# Patient Record
Sex: MALE | Race: BLACK OR AFRICAN AMERICAN | Employment: UNEMPLOYED | URBAN - METROPOLITAN AREA
[De-identification: names, ages, dates, MRNs, and addresses within clinical notes are randomized per-mention and may not be internally consistent; named-entity substitution may affect disease eponyms.]

---

## 2022-06-14 ENCOUNTER — APPOINTMENT (OUTPATIENT)
Dept: CT IMAGING | Age: 32
DRG: 753 | End: 2022-06-14
Payer: MEDICAID

## 2022-06-14 ENCOUNTER — HOSPITAL ENCOUNTER (INPATIENT)
Age: 32
LOS: 4 days | Discharge: HOME | DRG: 753 | End: 2022-06-20
Attending: EMERGENCY MEDICINE | Admitting: PSYCHIATRY & NEUROLOGY
Payer: MEDICAID

## 2022-06-14 DIAGNOSIS — R45.851 SUICIDAL IDEATION: Primary | ICD-10-CM

## 2022-06-14 LAB
ACETAMINOPHEN LEVEL: <5 MCG/ML (ref 10–30)
ALBUMIN SERPL-MCNC: 5.3 G/DL (ref 3.5–5.2)
ALP BLD-CCNC: 78 U/L (ref 40–129)
ALT SERPL-CCNC: 9 U/L (ref 0–40)
AMPHETAMINE SCREEN, URINE: NOT DETECTED
ANION GAP SERPL CALCULATED.3IONS-SCNC: 22 MMOL/L (ref 7–16)
AST SERPL-CCNC: 19 U/L (ref 0–39)
BACTERIA: ABNORMAL /HPF
BARBITURATE SCREEN URINE: NOT DETECTED
BASOPHILS ABSOLUTE: 0.02 E9/L (ref 0–0.2)
BASOPHILS RELATIVE PERCENT: 0.6 % (ref 0–2)
BENZODIAZEPINE SCREEN, URINE: NOT DETECTED
BILIRUB SERPL-MCNC: 2.2 MG/DL (ref 0–1.2)
BILIRUBIN URINE: ABNORMAL
BLOOD, URINE: ABNORMAL
BUN BLDV-MCNC: 15 MG/DL (ref 6–20)
CALCIUM SERPL-MCNC: 9.9 MG/DL (ref 8.6–10.2)
CANNABINOID SCREEN URINE: NOT DETECTED
CHLORIDE BLD-SCNC: 102 MMOL/L (ref 98–107)
CLARITY: ABNORMAL
CO2: 13 MMOL/L (ref 22–29)
COCAINE METABOLITE SCREEN URINE: NOT DETECTED
COLOR: YELLOW
CREAT SERPL-MCNC: 1.2 MG/DL (ref 0.7–1.2)
EOSINOPHILS ABSOLUTE: 0.04 E9/L (ref 0.05–0.5)
EOSINOPHILS RELATIVE PERCENT: 1.2 % (ref 0–6)
EPITHELIAL CELLS, UA: ABNORMAL /HPF
ETHANOL: <10 MG/DL (ref 0–0.08)
FENTANYL SCREEN, URINE: NOT DETECTED
GFR AFRICAN AMERICAN: >60
GFR NON-AFRICAN AMERICAN: >60 ML/MIN/1.73
GLUCOSE BLD-MCNC: 71 MG/DL (ref 74–99)
GLUCOSE URINE: NEGATIVE MG/DL
HCT VFR BLD CALC: 46.7 % (ref 37–54)
HEMOGLOBIN: 16 G/DL (ref 12.5–16.5)
IMMATURE GRANULOCYTES #: 0 E9/L
IMMATURE GRANULOCYTES %: 0 % (ref 0–5)
INFLUENZA A: NOT DETECTED
INFLUENZA B: NOT DETECTED
KETONES, URINE: >=80 MG/DL
LACTIC ACID: 1.2 MMOL/L (ref 0.5–2.2)
LEUKOCYTE ESTERASE, URINE: NEGATIVE
LYMPHOCYTES ABSOLUTE: 0.88 E9/L (ref 1.5–4)
LYMPHOCYTES RELATIVE PERCENT: 27.3 % (ref 20–42)
Lab: NORMAL
MCH RBC QN AUTO: 31.8 PG (ref 26–35)
MCHC RBC AUTO-ENTMCNC: 34.3 % (ref 32–34.5)
MCV RBC AUTO: 92.8 FL (ref 80–99.9)
METHADONE SCREEN, URINE: NOT DETECTED
MONOCYTES ABSOLUTE: 0.3 E9/L (ref 0.1–0.95)
MONOCYTES RELATIVE PERCENT: 9.3 % (ref 2–12)
MUCUS: PRESENT /LPF
NEUTROPHILS ABSOLUTE: 1.98 E9/L (ref 1.8–7.3)
NEUTROPHILS RELATIVE PERCENT: 61.6 % (ref 43–80)
NITRITE, URINE: NEGATIVE
OPIATE SCREEN URINE: NOT DETECTED
OXYCODONE URINE: NOT DETECTED
PDW BLD-RTO: 11.9 FL (ref 11.5–15)
PH UA: 6 (ref 5–9)
PHENCYCLIDINE SCREEN URINE: NOT DETECTED
PLATELET # BLD: 233 E9/L (ref 130–450)
PMV BLD AUTO: 9.7 FL (ref 7–12)
POTASSIUM REFLEX MAGNESIUM: 4.9 MMOL/L (ref 3.5–5)
PROTEIN UA: 100 MG/DL
RBC # BLD: 5.03 E12/L (ref 3.8–5.8)
RBC UA: ABNORMAL /HPF (ref 0–2)
SALICYLATE, SERUM: <0.3 MG/DL (ref 0–30)
SARS-COV-2 RNA, RT PCR: NOT DETECTED
SODIUM BLD-SCNC: 137 MMOL/L (ref 132–146)
SPECIFIC GRAVITY UA: >=1.03 (ref 1–1.03)
TOTAL PROTEIN: 8.1 G/DL (ref 6.4–8.3)
TRICYCLIC ANTIDEPRESSANTS SCREEN SERUM: NEGATIVE NG/ML
UROBILINOGEN, URINE: 0.2 E.U./DL
WBC # BLD: 3.2 E9/L (ref 4.5–11.5)
WBC UA: ABNORMAL /HPF (ref 0–5)

## 2022-06-14 PROCEDURE — 96361 HYDRATE IV INFUSION ADD-ON: CPT

## 2022-06-14 PROCEDURE — 85025 COMPLETE CBC W/AUTO DIFF WBC: CPT

## 2022-06-14 PROCEDURE — 80143 DRUG ASSAY ACETAMINOPHEN: CPT

## 2022-06-14 PROCEDURE — 87636 SARSCOV2 & INF A&B AMP PRB: CPT

## 2022-06-14 PROCEDURE — 80053 COMPREHEN METABOLIC PANEL: CPT

## 2022-06-14 PROCEDURE — 96360 HYDRATION IV INFUSION INIT: CPT

## 2022-06-14 PROCEDURE — 93005 ELECTROCARDIOGRAM TRACING: CPT | Performed by: PHYSICIAN ASSISTANT

## 2022-06-14 PROCEDURE — 99285 EMERGENCY DEPT VISIT HI MDM: CPT

## 2022-06-14 PROCEDURE — 2580000003 HC RX 258: Performed by: EMERGENCY MEDICINE

## 2022-06-14 PROCEDURE — 83605 ASSAY OF LACTIC ACID: CPT

## 2022-06-14 PROCEDURE — 82077 ASSAY SPEC XCP UR&BREATH IA: CPT

## 2022-06-14 PROCEDURE — 81001 URINALYSIS AUTO W/SCOPE: CPT

## 2022-06-14 PROCEDURE — 70450 CT HEAD/BRAIN W/O DYE: CPT

## 2022-06-14 PROCEDURE — 80179 DRUG ASSAY SALICYLATE: CPT

## 2022-06-14 PROCEDURE — 80307 DRUG TEST PRSMV CHEM ANLYZR: CPT

## 2022-06-14 RX ORDER — 0.9 % SODIUM CHLORIDE 0.9 %
1000 INTRAVENOUS SOLUTION INTRAVENOUS ONCE
Status: COMPLETED | OUTPATIENT
Start: 2022-06-14 | End: 2022-06-15

## 2022-06-14 RX ORDER — 0.9 % SODIUM CHLORIDE 0.9 %
1000 INTRAVENOUS SOLUTION INTRAVENOUS ONCE
Status: COMPLETED | OUTPATIENT
Start: 2022-06-14 | End: 2022-06-14

## 2022-06-14 RX ADMIN — SODIUM CHLORIDE 1000 ML: 9 INJECTION, SOLUTION INTRAVENOUS at 22:03

## 2022-06-14 RX ADMIN — SODIUM CHLORIDE 1000 ML: 9 INJECTION, SOLUTION INTRAVENOUS at 22:52

## 2022-06-14 ASSESSMENT — PATIENT HEALTH QUESTIONNAIRE - PHQ9: SUM OF ALL RESPONSES TO PHQ QUESTIONS 1-9: 25

## 2022-06-14 NOTE — ED NOTES
Department of Emergency Medicine  FIRST PROVIDER TRIAGE NOTE             Independent MLP           6/14/22  7:07 PM EDT    Date of Encounter: 6/14/22   MRN: [id number]      HPI: Sangeeta Delatorre is a 33 y.o. male who presents to the ED for Psychiatric Evaluation (+SI -HI. states 'feels that death would be easier then this' denies hallucinations )    Pt reports years of untreated anxiety and depression and now \"Death just feels so much easier. \"    ROS: Negative for cp or sob. PE: Gen Appearance/Constitutional: alert  Musculoskeletal: moves all extremities x 4     Initial Plan of Care: All treatment areas with department are currently occupied.  Plan to order/Initiate the following while awaiting opening in ED: labs, EKG, imaging studies and Social Work Eval.  Initiate Treatment-Testing, Proceed toTreatment Area When [employer] Available for ED Attending/MLP to [employer] signed by Daniel Johnson PA-C   DD: 6/14/22         Daniel Johnson PA-C  06/14/22 1494

## 2022-06-15 LAB
ANION GAP SERPL CALCULATED.3IONS-SCNC: 8 MMOL/L (ref 7–16)
BUN BLDV-MCNC: 12 MG/DL (ref 6–20)
CALCIUM SERPL-MCNC: 8.2 MG/DL (ref 8.6–10.2)
CHLORIDE BLD-SCNC: 109 MMOL/L (ref 98–107)
CO2: 18 MMOL/L (ref 22–29)
CREAT SERPL-MCNC: 1 MG/DL (ref 0.7–1.2)
GFR AFRICAN AMERICAN: >60
GFR NON-AFRICAN AMERICAN: >60 ML/MIN/1.73
GLUCOSE BLD-MCNC: 164 MG/DL (ref 74–99)
POTASSIUM REFLEX MAGNESIUM: 5 MMOL/L (ref 3.5–5)
SODIUM BLD-SCNC: 135 MMOL/L (ref 132–146)

## 2022-06-15 PROCEDURE — 80048 BASIC METABOLIC PNL TOTAL CA: CPT

## 2022-06-15 ASSESSMENT — ENCOUNTER SYMPTOMS
COUGH: 0
COLOR CHANGE: 0
CONSTIPATION: 0
CHEST TIGHTNESS: 0
NAUSEA: 0
SHORTNESS OF BREATH: 0
BACK PAIN: 0
ABDOMINAL PAIN: 0
DIARRHEA: 0
VOMITING: 0

## 2022-06-15 NOTE — ED NOTES
Patient to CT with transport. [facility] ([facility])  with patient due to pink slip.       Polo Garcia RN  06/14/22 2039

## 2022-06-15 NOTE — ED NOTES
Pt's relative (Balwinder Jules) called [facility] and asked about picking up pt's car keys in order to bring his car home from ED parking lot.  Pt's family was informed to let the [facility] police know when they arrive and why they are there, and to have police call [facility] to further assist.      Jayla Cain, EMILY  06/15/22 7334

## 2022-06-15 NOTE — ED PROVIDER NOTES
ED Attending shared visit  CC: No        Department of Emergency Medicine   ED  Provider Note  Admit Date/RoomTime: 6/14/2022  7:04 PM  ED Room: [address] [place] [address]  HPI:  6/14/22, Time: 11:11 PM EDT      The patient is a 38-year-old male with no known past medical history presenting to the emergency department with thoughts of suicide. Patient states that \"I am just not well. \"  He states that his sister told him that he has had too long of undiagnosed anxiety and depression and now he is feeling like death is the only way out. Patient states Velna Pole is just seems so much easier. \"  Patient denies any specific plan to injure himself but states \"I will just let nature take its course. \"  Patient denies a history of SI in the past.  He also denies HI. He denies use of any drugs or alcohol. He is not prescribed any medications. He denies any chest pain, shortness of breath, dizziness, abdominal pain, nausea vomiting, diarrhea. The history is provided by the patient. No  was used. REVIEW OF SYSTEMS:  Review of Systems   Constitutional: Negative for activity change, chills, fatigue and fever. Respiratory: Negative for cough, chest tightness and shortness of breath. Cardiovascular: Negative for chest pain, palpitations and leg swelling. Gastrointestinal: Negative for abdominal pain, constipation, diarrhea, nausea and vomiting. Genitourinary: Negative for dysuria, flank pain, frequency and hematuria. Musculoskeletal: Negative for back pain, neck pain and neck stiffness. Skin: Negative for color change, pallor and rash. Neurological: Negative for dizziness, light-headedness and headaches. Psychiatric/Behavioral: Positive for dysphoric mood and suicidal ideas. Negative for agitation, behavioral problems, confusion, hallucinations, self-injury and sleep disturbance. The patient is not nervous/anxious and is not hyperactive.        Pertinent positives and negatives are stated within HPI, all other systems reviewed and are negative.      --------------------------------------------- PAST HISTORY ---------------------------------------------  Past Medical History:  has no past medical history on file. Past Surgical History:  has no past surgical history on file. Social History:  reports that he has never smoked. He has never used smokeless tobacco. He reports previous alcohol use. He reports previous drug use. Family History: family history is not on file. The patients home medications have been reviewed. Allergies: Patient has no known allergies.     -------------------------------------------------- RESULTS -------------------------------------------------  All laboratory and radiology results have been personally reviewed by myself   LABS:  Results for orders placed or performed during the hospital encounter of 06/14/22   COVID-19 & Influenza Combo    Specimen: Nasopharyngeal Swab   Result Value Ref Range    SARS-CoV-2 RNA, RT PCR NOT DETECTED NOT DETECTED    INFLUENZA A NOT DETECTED NOT DETECTED    INFLUENZA B NOT DETECTED NOT DETECTED   CBC with Auto Differential   Result Value Ref Range    WBC 3.2 (L) 4.5 - 11.5 E9/L    RBC 5.03 3.80 - 5.80 E12/L    Hemoglobin 16.0 12.5 - 16.5 g/dL    Hematocrit 46.7 37.0 - 54.0 %    MCV 92.8 80.0 - 99.9 fL    MCH 31.8 26.0 - 35.0 pg    MCHC 34.3 32.0 - 34.5 %    RDW 11.9 11.5 - 15.0 fL    Platelets 216 098 - 842 E9/L    MPV 9.7 7.0 - 12.0 fL    Neutrophils % 61.6 43.0 - 80.0 %    Immature Granulocytes % 0.0 0.0 - 5.0 %    Lymphocytes % 27.3 20.0 - 42.0 %    Monocytes % 9.3 2.0 - 12.0 %    Eosinophils % 1.2 0.0 - 6.0 %    Basophils % 0.6 0.0 - 2.0 %    Neutrophils Absolute 1.98 1.80 - 7.30 E9/L    Immature Granulocytes # 0.00 E9/L    Lymphocytes Absolute 0.88 (L) 1.50 - 4.00 E9/L    Monocytes Absolute 0.30 0.10 - 0.95 E9/L    Eosinophils Absolute 0.04 (L) 0.05 - 0.50 E9/L    Basophils Absolute 0.02 0.00 - 0.20 E9/L   Comprehensive Metabolic Panel w/ Reflex to MG   Result Value Ref Range    Sodium 137 132 - 146 mmol/L    Potassium reflex Magnesium 4.9 3.5 - 5.0 mmol/L    Chloride 102 98 - 107 mmol/L    CO2 13 (L) 22 - 29 mmol/L    Anion Gap 22 (H) 7 - 16 mmol/L    Glucose 71 (L) 74 - 99 mg/dL    BUN 15 6 - 20 mg/dL    CREATININE 1.2 0.7 - 1.2 mg/dL    GFR Non-African American >60 >=60 mL/min/1.73    GFR African American >60     Calcium 9.9 8.6 - 10.2 mg/dL    Total Protein 8.1 6.4 - 8.3 g/dL    Albumin 5.3 (H) 3.5 - 5.2 g/dL    Total Bilirubin 2.2 (H) 0.0 - 1.2 mg/dL    Alkaline Phosphatase 78 40 - 129 U/L    ALT 9 0 - 40 U/L    AST 19 0 - 39 U/L   Urinalysis with Microscopic   Result Value Ref Range    Color, UA Yellow Straw/Yellow    Clarity, UA SL CLOUDY Clear    Glucose, Ur Negative Negative mg/dL    Bilirubin Urine SMALL (A) Negative    Ketones, Urine >=80 (A) Negative mg/dL    Specific Gravity, UA >=1.030 1.005 - 1.030    Blood, Urine SMALL (A) Negative    pH, UA 6.0 5.0 - 9.0    Protein,  (A) Negative mg/dL    Urobilinogen, Urine 0.2 <2.0 E.U./dL    Nitrite, Urine Negative Negative    Leukocyte Esterase, Urine Negative Negative    Mucus, UA Present (A) None Seen /LPF    WBC, UA 0-1 0 - 5 /HPF    RBC, UA 1-3 0 - 2 /HPF    Epithelial Cells, UA RARE /HPF    Bacteria, UA MODERATE (A) None Seen /HPF   URINE DRUG SCREEN   Result Value Ref Range    Amphetamine Screen, Urine NOT DETECTED Negative <1000 ng/mL    Barbiturate Screen, Ur NOT DETECTED Negative < 200 ng/mL    Benzodiazepine Screen, Urine NOT DETECTED Negative < 200 ng/mL    Cannabinoid Scrn, Ur NOT DETECTED Negative < 50ng/mL    Cocaine Metabolite Screen, Urine NOT DETECTED Negative < 300 ng/mL    Opiate Scrn, Ur NOT DETECTED Negative < 300ng/mL    PCP Screen, Urine NOT DETECTED Negative < 25 ng/mL    Methadone Screen, Urine NOT DETECTED Negative <300 ng/mL    Oxycodone Urine NOT DETECTED Negative <100 ng/mL    FENTANYL SCREEN, URINE NOT DETECTED Negative <1 ng/mL    Drug Screen Comment: see below    Serum Drug Screen   Result Value Ref Range    Ethanol Lvl <10 mg/dL    Acetaminophen Level <5.0 (L) 10.0 - 15.1 mcg/mL    Salicylate, Serum <6.4 0.0 - 30.0 mg/dL    TCA Scrn NEGATIVE Cutoff:300 ng/mL   Lactic Acid   Result Value Ref Range    Lactic Acid 1.2 0.5 - 2.2 mmol/L   Basic Metabolic Panel w/ Reflex to MG   Result Value Ref Range    Sodium 135 132 - 146 mmol/L    Potassium reflex Magnesium 5.0 3.5 - 5.0 mmol/L    Chloride 109 (H) 98 - 107 mmol/L    CO2 18 (L) 22 - 29 mmol/L    Anion Gap 8 7 - 16 mmol/L    Glucose 164 (H) 74 - 99 mg/dL    BUN 12 6 - 20 mg/dL    CREATININE 1.0 0.7 - 1.2 mg/dL    GFR Non-African American >60 >=60 mL/min/1.73    GFR African American >60     Calcium 8.2 (L) 8.6 - 10.2 mg/dL   EKG 12 Lead   Result Value Ref Range    Ventricular Rate 89 BPM    Atrial Rate 89 BPM    P-R Interval 152 ms    QRS Duration 116 ms    Q-T Interval 362 ms    QTc Calculation (Bazett) 440 ms    P Axis 85 degrees    R Axis 100 degrees    T Axis 82 degrees       RADIOLOGY:  Interpreted by Radiologist.  CT HEAD WO CONTRAST   Final Result   No acute intracranial abnormality.             ------------------------- NURSING NOTES AND VITALS REVIEWED ---------------------------   The nursing notes within the ED encounter and vital signs as below have been reviewed. /73   Pulse 64   Temp 98.8 °F (37.1 °C) (Oral)   Resp 16   Ht 5' 11\" (1.803 m)   Wt 150 lb (68 kg)   SpO2 100%   BMI 20.92 kg/m²   Oxygen Saturation Interpretation: Normal      ---------------------------------------------------PHYSICAL EXAM--------------------------------------    Physical Exam  Vitals and nursing note reviewed. Constitutional:       General: He is not in acute distress. Appearance: Normal appearance. He is well-developed. HENT:      Head: Normocephalic and atraumatic. Mouth/Throat:      Mouth: Mucous membranes are moist.      Pharynx: Oropharynx is clear.    Cardiovascular:      Rate and Rhythm: Normal rate and regular rhythm. Heart sounds: Normal heart sounds. No murmur heard. Pulmonary:      Effort: Pulmonary effort is normal. No respiratory distress. Breath sounds: Normal breath sounds. Musculoskeletal:         General: No swelling, tenderness or deformity. Normal range of motion. Cervical back: Normal range of motion and neck supple. No rigidity. Skin:     General: Skin is warm and dry. Capillary Refill: Capillary refill takes less than 2 seconds. Findings: No erythema. Neurological:      General: No focal deficit present. Mental Status: He is alert and oriented to person, place, and time. Mental status is at baseline. Coordination: Coordination normal.   Psychiatric:         Behavior: Behavior normal.         Thought Content: Thought content normal.      Comments: Patient extremely emotional and crying in triage. ------------------------------ ED COURSE/MEDICAL DECISION MAKING----------------------  Medications   0.9 % sodium chloride bolus (0 mLs IntraVENous Stopped 6/14/22 2251)   0.9 % sodium chloride bolus (0 mLs IntraVENous Stopped 6/15/22 0018)         ED COURSE:     CT HEAD WO CONTRAST   Final Result   No acute intracranial abnormality. Procedures:  Procedures     Medical Decision Making:   MDM   75-year-old male with no significant history presenting the ED with suicidal ideation. Patient reports that lately he has been extremely emotional and cannot take his anxiety and depression anymore. He states that has been untreated for too many years and he feels that death would be easier at this time. He has no specific plan that he is willing to disclose but does report that Ally Montano is going to let nature take its course. \"  It is unclear what patient means by this. He has no history in his chart. Patient is extremely emotional and tearful in triage. He is afebrile and hemodynamically stable here.   He has no visual or auditory hallucinations. Initial labs did show dehydration with a bicarb of 13 and anion gap of 22. Patient was given 2 L of fluid and his labs improved. This was likely dehydration. Patient's urine drug screen is negative. Patient is medically cleared for psychiatric admission. Counseling: The emergency provider has spoken with the patient and discussed todays results, in addition to providing specific details for the plan of care and counseling regarding the diagnosis and prognosis. Questions are answered at this time and they are agreeable with the plan.      --------------------------------- IMPRESSION AND DISPOSITION ---------------------------------    IMPRESSION  1. Suicidal ideation        DISPOSITION  Disposition: Medically cleared for psychiatric admission pending social work eval and placement. Electronically signed by Brooklyn Benitez PA-C   DD: 6/14/22  **This report was transcribed using voice recognition software. Every effort was made to ensure accuracy; however, inadvertent computerized transcription errors may be present.   END OF ED PROVIDER NOTE          Brooklyn Benitez PA-C  06/15/22 9990

## 2022-06-15 NOTE — ED NOTES
SW called to confirm referral received to [facility]. SW spoke to [employer], who reports there are no beds available at this time.  Pt is 9th on the list.      Frances Sanchez, LSW  06/15/22 8367

## 2022-06-15 NOTE — ED NOTES
Bed day approval for [facility] was given by Mitchael Riedel, MSW, GAGANDEEPW  06/15/22 8672

## 2022-06-15 NOTE — ED NOTES
Pt gave Haylee Morales permission to take his 2 sets of keys.   Given to juan alberto by [facility] officers by this time as pt is at officer station in ed waiting area     [facility]  06/15/22 2227 [employer]

## 2022-06-15 NOTE — ED NOTES
Behavioral Health Crisis Assessment      Chief Complaint: \"For a few days, I been feeling really weak and fatigue. My body and mind are deteriorating. \"    Mental Status Exam: Pt is alert and oriented x3, eye contact avoidant, speech is clear, low in tone, mood is very depressed, appearance unkempt, flat affect, withdrawn behavior, denies auditory and visual hallucinations, denies delusions, admits to suicidal ideation but denies homicidal ideations, insight and judgement are poor. Legal Status  [] Voluntary:  [x] Involuntary, Issued by:  ED doctor    Gender  [x] Male [] Female [] Transgender  [] Other    Sexual Orientation    [x] Heterosexual [] Homosexual [] Bisexual [] Other    Brief Clinical Summary: Pt is a 33year old male, presenting to the ED for suicidal ideations without a plan. Pt reports that his body is deteriorating and he has never felt this fatigue before. Pt states that his body is not functioning properly and states that he wishes he could die in peace in the house that he grew up in. Pt is currently living in his childhood home. Pt was his mother's primary care giver up until her death in February this year. Pt states his brother and sister in law convinced him to go to the ED for an evaluation because they are worried about him and his overall well being. Pt denies having a plan but states he has had two previous suicide attempts in the past. Pt admits he tried cutting his arm with a butter knife and tried to overdose on Nyquil over ten years ago. Pt is not currently linked with any mental health services in the community. Pt reports he is not taking any psychiatric medications at this time. Pt states he doesn't believe in taking medications. Pt denies auditory and visual hallucinations. Pt does not have any previous psych admits.       Collateral Information: none    Risk Factors:    Recent loss- mother  from cancer 4 months ago  History of trauma  Lack of self care  Not linked in community to mental health provider  Chronic physical ailments    Protective Factors:  Strong family support from brother and sister in law  Initiated this ED visit  Help seeking behavior  Has access to essential needs  No access to weapons    Suicidal Ideations:   [x] Reports:    [x] Past [x] Present pt states he wishes he was dead or that he could \"go to sleep and not wake up. \"   [] Denies    Suicide Attempts:  [x] Reports: attempted to cut himself with a butte knife and tried overdosing on Nyquil about ten years ago. [] Denies    C-SSRS Screening Completed by RN: Current Suicide Risk:  [] None  [x] Low [] Moderate [] High    Homicidal Ideations  [] Reports:   [] Past [] Present   [x] Denies     Self Injurious/Self Mutilation Behaviors:   [x] Reports:    [x] Past [] Present tried to cut arm with a butter knife and tried to overdose on Nyquil- over ten years ago. [] Denies    Hallucinations/Delusions   [] Reports:   [x] Denies     Substance Use/Alcohol Use/Addiction:   [] Reports:   [x] Denies   [x] SBIRT Screen Complete. Current or Past Substance Abuse Treatment  [] Yes, When and Where:  [x] No    Current or Past Mental Health Treatment:  [] Yes, When and Where:  [x] No    Legal Issues:  []  Yes (Specify)  [x]  No    Access to Weapons:  []  Yes (Specify)  [x]  No    Trauma History  [x] Reports: mom  from cancer in 2022  [] Denies     Living Situation: lives alone in [employer] house    Employment: unemployed    Education Level: college degree in [address] [place], [place] [address]bet:  1. Have you ever thought about hurting someone? [x]  No  []  Yes (Ask the questions listed below)   When?  Did you follow through with the thoughts? [x] No     [] Yes- When and what happened? 2.  Have you ever threatened anyone? [x]  No  []  Yes (Ask the questions listed below)   When and what happened?  Have you ever threatened someone with a gun, knife or other weapon?    [x]  No  [] Yes - When and what happened? 2. Have you ever had an order of protection taken out against you? []  Yes [x]  No  3. Have you ever been arrested due to violence? []  Yes [x]  No  4. Have you ever been cruel to animals? []  Yes [x]  No    After consideration of C-SSRS screening results, C-SSRS assessments, and this professional's assessment the patient's overall suicide risk assessed to be:  [] None   [] Low   [x] Moderate   [] High     [x] Discussed current suicide risk, protective and risk factors with RN and ED Physician     Disposition   [] Home:   [] Outpatient Provider:   [] Crisis Unit:   [x] Inpatient Psychiatric Unit:  [] Other:     [facility] SW will pursue inpatient admission.                 Carter Nieves  06/14/22 2185

## 2022-06-16 PROBLEM — F32.A DEPRESSION WITH SUICIDAL IDEATION: Status: ACTIVE | Noted: 2022-06-16

## 2022-06-16 PROBLEM — R45.851 DEPRESSION WITH SUICIDAL IDEATION: Status: ACTIVE | Noted: 2022-06-16

## 2022-06-16 LAB
EKG ATRIAL RATE: 89 BPM
EKG P AXIS: 85 DEGREES
EKG P-R INTERVAL: 152 MS
EKG Q-T INTERVAL: 362 MS
EKG QRS DURATION: 116 MS
EKG QTC CALCULATION (BAZETT): 440 MS
EKG R AXIS: 100 DEGREES
EKG T AXIS: 82 DEGREES
EKG VENTRICULAR RATE: 89 BPM

## 2022-06-16 PROCEDURE — 6370000000 HC RX 637 (ALT 250 FOR IP): Performed by: PSYCHIATRY & NEUROLOGY

## 2022-06-16 PROCEDURE — 1240000000 HC EMOTIONAL WELLNESS R&B

## 2022-06-16 RX ORDER — ACETAMINOPHEN 325 MG/1
650 TABLET ORAL EVERY 6 HOURS PRN
Status: DISCONTINUED | OUTPATIENT
Start: 2022-06-16 | End: 2022-06-20 | Stop reason: HOSPADM

## 2022-06-16 RX ORDER — LANOLIN ALCOHOL/MO/W.PET/CERES
3 CREAM (GRAM) TOPICAL NIGHTLY
Status: DISCONTINUED | OUTPATIENT
Start: 2022-06-16 | End: 2022-06-20 | Stop reason: HOSPADM

## 2022-06-16 RX ORDER — NICOTINE 21 MG/24HR
1 PATCH, TRANSDERMAL 24 HOURS TRANSDERMAL DAILY
Status: DISCONTINUED | OUTPATIENT
Start: 2022-06-16 | End: 2022-06-19

## 2022-06-16 RX ORDER — MAGNESIUM HYDROXIDE/ALUMINUM HYDROXICE/SIMETHICONE 120; 1200; 1200 MG/30ML; MG/30ML; MG/30ML
30 SUSPENSION ORAL PRN
Status: DISCONTINUED | OUTPATIENT
Start: 2022-06-16 | End: 2022-06-20 | Stop reason: HOSPADM

## 2022-06-16 RX ORDER — HALOPERIDOL 5 MG/ML
5 INJECTION INTRAMUSCULAR EVERY 6 HOURS PRN
Status: DISCONTINUED | OUTPATIENT
Start: 2022-06-16 | End: 2022-06-20 | Stop reason: HOSPADM

## 2022-06-16 RX ORDER — HALOPERIDOL 5 MG
5 TABLET ORAL EVERY 6 HOURS PRN
Status: DISCONTINUED | OUTPATIENT
Start: 2022-06-16 | End: 2022-06-20 | Stop reason: HOSPADM

## 2022-06-16 RX ORDER — HYDROXYZINE PAMOATE 50 MG/1
50 CAPSULE ORAL 3 TIMES DAILY PRN
Status: DISCONTINUED | OUTPATIENT
Start: 2022-06-16 | End: 2022-06-20 | Stop reason: HOSPADM

## 2022-06-16 RX ADMIN — Medication 3 MG: at 21:32

## 2022-06-16 ASSESSMENT — SLEEP AND FATIGUE QUESTIONNAIRES
DO YOU HAVE DIFFICULTY SLEEPING: YES
DO YOU USE A SLEEP AID: NO
SLEEP PATTERN: RESTLESSNESS;DISTURBED/INTERRUPTED SLEEP
AVERAGE NUMBER OF SLEEP HOURS: 9

## 2022-06-16 ASSESSMENT — PAIN SCALES - GENERAL: PAINLEVEL_OUTOF10: 0

## 2022-06-16 ASSESSMENT — PAIN - FUNCTIONAL ASSESSMENT: PAIN_FUNCTIONAL_ASSESSMENT: NONE - DENIES PAIN

## 2022-06-16 ASSESSMENT — PATIENT HEALTH QUESTIONNAIRE - PHQ9: SUM OF ALL RESPONSES TO PHQ QUESTIONS 1-9: 26

## 2022-06-16 NOTE — ED NOTES
ANNETTE reached to [facility] at [phone] ext [phone] to inquire on bed availability. ANNETTE spoke to Vinay Yang who reported Pt case is still under review and has not been accepted yet nor to they have any male beds at this time. ANNETTE was advised to follow up during day shift for another update.         MICAH Juarez, [place]  06/15/22 3902

## 2022-06-16 NOTE — ED NOTES
PERFECT SERVED DR [facility] / Darlin Calzada NP AWAITING RETURN CALL     Mark Mohan RN  06/16/22 6985

## 2022-06-16 NOTE — ED NOTES
Received call from vazquez with [facility] intake @ [phone] confimring if pt stll was in need of a bed reported may have 1 discharge today      Senia Dixon RN  06/16/22 5196

## 2022-06-17 PROBLEM — F60.89 CLUSTER B PERSONALITY DISORDER (HCC): Status: ACTIVE | Noted: 2022-06-17

## 2022-06-17 PROBLEM — F32.9 MAJOR DEPRESSIVE DISORDER, SINGLE EPISODE WITH MIXED FEATURES: Status: ACTIVE | Noted: 2022-06-17

## 2022-06-17 PROCEDURE — 90792 PSYCH DIAG EVAL W/MED SRVCS: CPT | Performed by: NURSE PRACTITIONER

## 2022-06-17 PROCEDURE — 6370000000 HC RX 637 (ALT 250 FOR IP): Performed by: NURSE PRACTITIONER

## 2022-06-17 PROCEDURE — 6370000000 HC RX 637 (ALT 250 FOR IP): Performed by: PSYCHIATRY & NEUROLOGY

## 2022-06-17 PROCEDURE — 1240000000 HC EMOTIONAL WELLNESS R&B

## 2022-06-17 RX ORDER — CITALOPRAM 20 MG/1
10 TABLET ORAL DAILY
Status: DISCONTINUED | OUTPATIENT
Start: 2022-06-17 | End: 2022-06-20 | Stop reason: HOSPADM

## 2022-06-17 RX ORDER — OXCARBAZEPINE 150 MG/1
150 TABLET, FILM COATED ORAL 2 TIMES DAILY
Status: DISCONTINUED | OUTPATIENT
Start: 2022-06-17 | End: 2022-06-20 | Stop reason: HOSPADM

## 2022-06-17 RX ADMIN — CITALOPRAM HYDROBROMIDE 10 MG: 20 TABLET ORAL at 16:22

## 2022-06-17 RX ADMIN — Medication 3 MG: at 20:39

## 2022-06-17 RX ADMIN — OXCARBAZEPINE 150 MG: 150 TABLET, FILM COATED ORAL at 20:39

## 2022-06-17 ASSESSMENT — LIFESTYLE VARIABLES
HOW OFTEN DO YOU HAVE A DRINK CONTAINING ALCOHOL: NEVER
HOW MANY STANDARD DRINKS CONTAINING ALCOHOL DO YOU HAVE ON A TYPICAL DAY: PATIENT DECLINED
HOW MANY STANDARD DRINKS CONTAINING ALCOHOL DO YOU HAVE ON A TYPICAL DAY: 1 OR 2
HOW OFTEN DO YOU HAVE A DRINK CONTAINING ALCOHOL: NEVER

## 2022-06-17 ASSESSMENT — PATIENT HEALTH QUESTIONNAIRE - PHQ9: SUM OF ALL RESPONSES TO PHQ QUESTIONS 1-9: 23

## 2022-06-17 ASSESSMENT — PAIN SCALES - GENERAL
PAINLEVEL_OUTOF10: 0
PAINLEVEL_OUTOF10: 0

## 2022-06-17 ASSESSMENT — SLEEP AND FATIGUE QUESTIONNAIRES
SLEEP PATTERN: DISTURBED/INTERRUPTED SLEEP;RESTLESSNESS
AVERAGE NUMBER OF SLEEP HOURS: 9
DO YOU USE A SLEEP AID: NO
DO YOU HAVE DIFFICULTY SLEEPING: YES

## 2022-06-17 NOTE — PLAN OF CARE
Patient was out on unit,social with peers. Affect pleasant,cooperative. Denies SI,HI or AV hallucinations. Verbalizes no anxiety currently ,\"only when I thinking of finances or thinking about my goals. \"  Depression 1 out of 10 ,\"my mother passed in February and I'm attempting to get everything in order prior to time estate to close so can sell house. I want help But won't ask for help do to my pride , Erma Morel and I would just do my self. \"  Sleep not so good because roommate snored. [facility],  Attending groups . Compliant with medications. Q 15 minute rounds continue.

## 2022-06-17 NOTE — PROGRESS NOTES
CTTS provided education on the tobacco cessation program. CTTS gave client detailed information regarding the programs offered through the tobacco treatment program. Client was engaged and asked questions for CTTS.      Electronically signed by MICAH Floyd on 6/17/2022 at 3:02 PM

## 2022-06-17 NOTE — PROGRESS NOTES
Patient was out on unit,social with peers,playing crds. Luis M SI,HI or AV hallucinations. Denies depression or anxiety. Attended groups. Compliant with medications. Q 15 minute rounds continue.

## 2022-06-17 NOTE — H&P
Department of Psychiatry  History and Physical - Adult     CHIEF COMPLAINT: \"I was feeling suicidal.\"    Patient was seen after discussing with the treatment team and reviewing the chart    CIRCUMSTANCES OF ADMISSION: presented to the ED brought in by himself reporting that he has been feeling really weak and fatigued and mood feels as though his mind and body are deteriorating. In the ED patient states that his body is not functioning properly he reported suicidal ideations reports that his brother and sister-in-law encouraged him to come to the hospital to get help for his suicidal thoughts. HISTORY OF PRESENT ILLNESS:      The patient is a 33 y.o. male with significant past history of asthma presented to the ED brought in by himself reporting that he has been feeling really weak and fatigued and mood feels as though his mind and body are deteriorating. In the ED patient states that his body is not functioning properly he reported suicidal ideations reports that his brother and sister-in-law encouraged him to come to the hospital to get help for his suicidal thoughts. In the ED his urine drug screen was negative his blood alcohol level is negative his medically clear admitted 9 SE. old psychiatric unit for 3 other psychiatric assessment stabilization and treatment upon evaluation today patient states that he has always felt suicidal his whole life. He states that he is attempted to commit suicide or cut himself multiple times in the past but has never been able to [facility] through with it. \"  He states one time he was going to grab a butter knife in the drawer to cut himself with that he could not bring himself to cut himself. He states that his grief from losing his mother has been not what he expected to be he states sometimes he cries when he should not be crying and sometimes he feels calm and at peace when he should be crying.   He states that he has had problems with his sleep and appetite he states that prior to coming into the hospital he was not able to eat any food for several days and was just drinking water. He also has lost interest in things he used to enjoy. He endorses that his mood does swing from 1 minute to the next where 1 minute he will be [place] Sabrina on life. \"  And the next, He will be severely depressed he also endorses feeling empty inside, he feels very easily abandoned by others and states that sometimes when he looks in the mirror he is not the person he is supposed to be. He denies any auditory or visual hallucinations denies any paranoia or any other perceptual abnormalities. Patient states that prior to coming to the hospital he was \"very suicidal.\"  And he is allowed to take medication to get help. Past psychiatric history: Patient denies any history of any inpatient or any outpatient psychiatric treatment. He denies ever attempting suicide he denies any history of cutting although he states he thought about cutting in the past    Family psychiatric history: Patient states a second cousin has bipolar disorder denies any when the family committed suicide    Legal history: Patient denies any legal history    Substance abuse history: Patient states he only very rarely has used marijuana urine drug screen blood alcohol level were negative    Personal family and social history: Patient states he grew up in [place] raised by his mom he graduated from high school in . He has never been  he has no kids he was living independently in the home he shared with his mother. His mother recently  in February of this year. 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 He denies access to any guns. He denies any history of physical sexual motion abuse while growing up      Past Medical History:        Diagnosis Date    Asthma        Medications Prior to Admission:   No medications prior to admission. Past Surgical History:    History reviewed.  No pertinent surgical history. Allergies:   Nuts [peanut-containing drug products]    Family History  Family History   Problem Relation Age of Onset    Cancer Mother     No Known Problems Father              EXAMINATION:    REVIEW OF SYSTEMS:    ROS:  [x] All negative/unchanged except if checked. Explain positive(checked items) below:  [] Constitutional  [] Eyes  [] Ear/Nose/Mouth/Throat  [] Respiratory  [] CV  [] GI  []   [] Musculoskeletal  [] Skin/Breast  [] Neurological  [] Endocrine  [] Heme/Lymph  [] Allergic/Immunologic    Explanation:     Vitals:  BP 99/62   Pulse 57   Temp 98.5 °F (36.9 °C) (Oral)   Resp 15   Ht 5' 11\" (1.803 m)   Wt 150 lb (68 kg)   SpO2 98%   BMI 20.92 kg/m²      Physical Examination:   Head: x  Atraumatic: x normocephalic  Skin and Mucosa        Moist x  Dry   Pale  x Normal   Neck:  Thyroid  Palpable   x  Not palpable   venus distention   adenopathy   Chest: x Clear   Rhonchi     Wheezing   CV:  xS1   xS2    xNo murmer   Abdomen:  x  Soft    Tender    Viceromegaly   Extremities:  x No Edema     Edema     Cranial Nerves Examination:   CN II:   xPupils are reactive to light  Pupils are non reactive to light  CN III, IV, VI:  xNo eye deviation    No diplopia or ptosis   CN V:    xFacial Sensation is intact     Facial Sensation is not intact   CN IIIV:   x Hearing is normal to rubbing fingers   CN IX, X:     xNormal gag reflex and phonation   CN XI:   xShoulder shrug and neck rotation is normal  CNXII:    xTongue is midline no deviation or atrophy    Mental Status Examination:    Level of consciousness:  within normal limits   Appearance:  well-appearing  Behavior/Motor:  no abnormalities noted  Attitude toward examiner:  cooperative  Speech:  spontaneous, normal rate and normal volume   Mood: \" I am starting to feel better. \"  Affect: Appropriate and pleasant  Thought processes: Linear without flight of ideas loose associations  Thought content: Devoid of any auditory visualizations delusions or other perceptual normalities. Denies current suicidal ideation intent or plan denies homicidal ideations intent or plan denies any auditory visualizations delusions or other perceptual abnormalities  Cognition:  oriented to person, place, and time   Concentration intact  Memory intact  Insight fair  Judgement fair  Fund of Knowledge adequate      DIAGNOSIS:  MDD mixed  Cluster B personality disorder          LABS: REVIEWED TODAY:  Recent Labs     06/14/22 1959   WBC 3.2*   HGB 16.0        Recent Labs     06/14/22  1959 06/15/22  0025    135   K 4.9 5.0    109*   CO2 13* 18*   BUN 15 12   CREATININE 1.2 1.0   GLUCOSE 71* 164*     Recent Labs     06/14/22 1959   BILITOT 2.2*   ALKPHOS 78   AST 19   ALT 9     Lab Results   Component Value Date    LABAMPH NOT DETECTED 06/14/2022    BARBSCNU NOT DETECTED 06/14/2022    LABBENZ NOT DETECTED 06/14/2022    LABMETH NOT DETECTED 06/14/2022    OPIATESCREENURINE NOT DETECTED 06/14/2022    PHENCYCLIDINESCREENURINE NOT DETECTED 06/14/2022    ETOH <10 06/14/2022     No results found for: TSH, FREET4  No results found for: LITHIUM  No results found for: VALPROATE, CBMZ  No results found for: LITHIUM, VALPROATE      Radiology CT HEAD WO CONTRAST    Result Date: 6/14/2022  EXAMINATION: CT OF THE HEAD WITHOUT CONTRAST  6/14/2022 8:39 pm TECHNIQUE: CT of the head was performed without the administration of intravenous contrast. Automated exposure control, iterative reconstruction, and/or weight based adjustment of the mA/kV was utilized to reduce the radiation dose to as low as reasonably achievable. COMPARISON: None. HISTORY: ORDERING SYSTEM PROVIDED HISTORY: Evaluate intracranial abnormality TECHNOLOGIST PROVIDED HISTORY: Has a \"code stroke\" or \"stroke alert\" been called? ->No Reason for exam:->Evaluate intracranial abnormality Decision Support Exception - unselect if not a suspected or confirmed emergency medical condition->Emergency Medical Condition (MA) What reading provider will be dictating this exam?->CRC FINDINGS: BRAIN/VENTRICLES: There is no acute intracranial hemorrhage, mass effect or midline shift. No abnormal extra-axial fluid collection. The gray-white differentiation is maintained without evidence of an acute infarct. There is no evidence of hydrocephalus. ORBITS: The visualized portion of the orbits demonstrate no acute abnormality. SINUSES: Mucosal thickening involving bilateral maxillary sinuses, ethmoid sinuses, and sphenoid sinuses. Mastoid air cells are clear. SOFT TISSUES/SKULL:  No acute abnormality of the visualized skull or soft tissues. No acute intracranial abnormality. TREATMENT PLAN:    Risk Management: Based on the diagnosis and assessment biopsychosocial treatment model was presented to the patient and was given the opportunity to ask any question. The patient was agreeable to the plan and all the patient's questions were answered to the patient's satisfaction. I discussed with the patient the risk, benefit, alternative and common side effects for the proposed medication treatment. The patient is consenting to this treatment. Collateral Information:  Will obtain collateral information from the family or friends. Will obtain medical records as appropriate from out patient providers  Will consult the hospitalist for a physical exam to rule out any co-morbid physical condition. Patient's diagnosis, treatment plan, medication management was formulated at the end of evaluation and after reviewing relevant documentation. Patient was seen directly by myself and Dr. Jeramie Silver    Can discontinue constant observer.   Patient is deemed to be moderate risk for suicide based on productive risk factors as well as risk mitigation  :    Risk Factors:    Recent loss- mother  from cancer 4 months ago  History of trauma  Lack of self care  Not linked in community to mental health provider  Chronic physical ailments     Protective Factors:  Strong family support from brother and sister in law  Initiated this ED visit  Help seeking behavior  Has access to essential needs  No access to weapons    Prn Haldol 5mg and Vistaril 50mg q6hr for extreme agitation. Trazodone as ordered for insomnia  Vistaril as ordered for anxiety      Psychotherapy:   Encourage participation in milieu and group therapy  Individual therapy as needed              Behavioral Services  Medicare Certification Upon Admission    I certify that this patient's inpatient psychiatric hospital admission is medically necessary for:    [x] (1) Treatment which could reasonably be expected to improve this patient's condition,       [] (2) Or for diagnostic study;     AND     [x](2) The inpatient psychiatric services are provided while the individual is under the care of a physician and are included in the individualized plan of care.     Estimated length of stay/service 3 to 7 days based on stability    Plan for post-hospital care outpatient psychiatric and counseling services    Electronically signed by Verner Blumenthal, APRN - CNP on 4/23/4027 at 8:20 AM        Electronically signed by Verner Blumenthal, APRN - CNP on 2/08/9634 at 8:20 AM

## 2022-06-17 NOTE — PROGRESS NOTES
[facility]  Admission Note       Pt denies SI, HI and AVH. Pt stated that he has increased depression since his mother  in 2021. Pt positive for anxiety. Pt has no hx of treatment. Pt stated sometimes he gets SI thoughts but \"I'm too lazy to do anything. I know I won't. \" Pt stated that he is going to live with his God father in [place]. Pt is very cooperative but anxious. Out on the unit. Will continue to monitor. Admission Type:   Admission Type: Involuntary    Reason for admission:  Reason for Admission: \"I felt like my life was ending but I didn't care enough to do anything about it. \"    PATIENT STRENGTHS:       Patient Strengths and Limitations:       Addictive Behavior:   Addictive Behavior  In the Past 3 Months, Have You Felt or Has Someone Told You That You Have a Problem With  : Eating (too much/too little),Sex/pornography    Medical Problems:   Past Medical History:   Diagnosis Date    Asthma        Status EXAM:  Mental Status and Behavioral Exam  Normal: No  Level of Assistance: Independent/Self  Facial Expression: Sad  Affect: Blunt  Level of Consciousness: Alert  Frequency of Checks: 4 times per hour, close  Mood:Normal: No  Mood: Anxious  Motor Activity:Normal: Yes  Eye Contact: Fair  Observed Behavior: Cooperative  Sexual Misconduct History: Current - no  Preception: [place] to person,[place] to time,[place] to place,[place] to situation  Attention:Normal: No  Attention: Distractible  Thought Processes: Circumstantial  Thought Content:Normal: No  Thought Content: Preoccupations  Depression Symptoms: Feelings of helplessness,Feelings of hopelessess,Impaired concentration  Anxiety Symptoms: Generalized  [place]ne Symptoms: No problems reported or observed.   Hallucinations: None  Delusions: No  Memory:Normal: Yes  Insight and Judgment: Yes    Tobacco Screening:  Practical Counseling, on admission, jaki X, if applicable and completed (first 3 are required if patient doesn't refuse):            ( )  Recognizing danger situations (included triggers and roadblocks)                    ( )  Coping skills (new ways to manage stress, exercise, relaxation techniques, changing routine, distraction)                                                           ( )  Basic information about quitting (benefits of quitting, techniques in how to quit, available resources  ( ) Referral for counseling faxed to [facility]                                           ( ) Patient refused counseling  ( x) Patient has not smoked in the last 30 days    Metabolic Screening:    No results found for: LABA1C    No results found for: CHOL  No results found for: TRIG  No results found for: HDL  No components found for: LDLCAL  No results found for: LABVLDL      Body mass index is 20.92 kg/m². BP Readings from Last 2 Encounters:   06/16/22 121/76           Pt admitted with followings belongings:  Dental Appliances: None  Vision - Corrective Lenses: Eyeglasses  Hearing Aid: None  Jewelry: None  Body Piercings Removed: N/A  Clothing: Footwear,Pants,Shirt,Jacket/Coat,Socks,Undergarments  Other Valuables: Christina Early (keys uncle)     Patient's home medications were n/a. Patient oriented to surroundings and program expectations and copy of patient rights given. Received admission packet:  yes. Consents reviewed, signed yes. Refused n/a. Patient verbalize understanding:  yes.   Patient education on precautions: yes                   Pacheco Ramos RN

## 2022-06-17 NOTE — CARE COORDINATION
Sw received call from [facility]. They report that they have pt's referral and were inquiring if pt still needed admitted to their facility. Sw asked on call nurse manager Polly Triplett, she reports that pt is approved bed days here and no longer needs referral to [facility]. Sai informed [place].

## 2022-06-17 NOTE — CARE COORDINATION
Biopsychosocial Assessment Note    Social work met with patient to complete the biopsychosocial assessment and C-SSRS. Chief Complaint: Pt reported that he came to the hospital because \"I felt like my mind and body were deteriorating and I didn't care what happened to me due to my depression and anxiety. \" Per KENA[facility] SW note \"For a few days, I been feeling really weak and fatigue. My body and mind are deteriorating. \"     Mental Status Exam: Pt is alert and oriented x4. Pt's facial expressions are brightened, affect is incongruent. Pt reported that his mood is anxious and depressed. Pt's speech is clear, rate is fast, volume is normal. Pt's eye contact is good. Pt's thought process is circumstantial, thought content is logical. Pt is calm and cooperative. Pt's insight and judgement is fair. Pt is a fair historian. Pt is easily distracted. Pt denied current SI, HI, AVH. Clinical Summary: Pt stated that he is currently living in the home that he grew up in alone. Pt stated that he does have access to basic needs and denied having any problems with his living environment. Pt reported that he is not currently working as he enjoys poetry and writing and this does not bring in an income for him. Pt stated that he is not on any government assistance because he feels that it would take his pride away if he applied for government assistance. Pt stated that he fears his utilities will get shut off soon due to having no employment to pay his bills. Pt reported that he has a good support system in place including his brother and sister in law, god father, and 2 friends from childhood. Pt denied having any medical problems or concerns. Pt reported that he is physically able to care for himself but sometime completing tasks leaves him feeling exhausted. Pt reported that he saw a counselor when he was in highschool and some in college but he is not currently active.  Pt stated that he does not have insurance and this is a barrier for treatment. Pt reported that he is single and has no children. Pt reported that he was raised by his mom who passed away several months ago. Pt stated that he has 3 brothers and 4 1/2 sisters. Pt is unsure of any family history of mental illness, pt reported that no one ever talks about their personal problems. Pt denied having any legal history. Pt denied having any past trauma, abuse, or PTSD. Pt reported to using edibles, rarely, last used 6/4/2022. Pt denied the need for substance use treatment or past treatment. Pt stated that he has a bachelors degree in communications. Pt denied any  service. Pt stated that his main stressors are his finances, and anxiety. Pt stated that there are things he regrets from his past and he worries about his future and his goals in life. Pt stated that his sleep is all over the place and he takes names on occasion. Pt stated that he is spiritual and he believes in a higher power. Pt reported that he has had thoughts about wanting to end his life. Pt reported that he has had plans and has attempted to carry them out. Pt reported that his last suicide attempt was when he took a butter knife and was going to cut his wrists. Pt reported that when he put the knife on his wrist he stopped himself. Pt reported that he has self-aborted his attempts because he tells himself that things are going to get better. Pt reported that he feels that he \"cant do it\" when he has a plan to end his life. Pt reported to having little interest and pleasure in doing things and reported to feeling hopeless and helpless. Risk Factors: recently lost mother, at risk of losing home/ utilities in home, pt is not active with a mental health provider, pt does not have the energy to care for himself.  Pt is unemployed and currently has no income or government assistance, pt has had past thoughts and plans to end his life, pt has been feeling hopeless and helpless, pt has had little interest and pleasure in doing things, pt admits to the use of marijuana. Protective Factors: pt has a good support system in place, pt is spiritual/ Restorationism, pt is able to identify stressors, pt reported that he is not able to follow through with ending his life with hopes that things will be better, pt has help seeking behaviors, pt is educated, pt has no legal history.      Gender  [x] Male [] Female [] Transgender  [] Other    Sexual Orientation    [] Heterosexual [] Homosexual [] Bisexual [] Other    Suicidal Ideation  [x] Past [] Present [] Denies     C-SSRS Screening Completed: Current Suicide Risk:  [] No Risk  [] Low [x] Moderate [] High    Homicidal Ideation  [] Past [] Present [x] Denies     Hallucinations/Delusions (Specify type)  [] Reports [x] Denies     Current or Past Mental Health Treatment:  [] Yes, When and Where:   [x] No    Substance Use/Alcohol Use/Addiction  [x] Reports [] Denies     Tobacco Use (within the last 6 months)  [] Reports [x] Denies     Trauma History  [] Reports [x] Denies     Self Injurious/Self Mutilation Behaviors:   [x] Reports:    [x] Past [] Present   [] Denies    Legal History:  []  Yes (Specify)    [x] No    Collateral Contact (LA signed)  Name: Sammi Hernandez  Relationship: Godfather  Number: [phone] (cell)  [phone] (home)     Collateral Information:      Access to Weapons per Collateral Contact: [] Reports [] Denies     After consideration of C-SSRS screening results, C-SSRS assessments, and this professional's assessment the patient's overall suicide risk assessed to be:  [] None   [] Low   [x] Moderate   [] High     [x] Discussed current suicide risk, protective and risk factors with RN and NP/Psychiatrist.    Discharge Plan:  [x] Home: Pt stated that he wants to go to his god fathers house in [place], pt stated that his godfather will be able to pick him up from the hospital.   [] Shelter:  [] Crisis Unit:  [] Substance Abuse Rehab:  [] Nursing Facility:  [] Other (Specify): Follow up Provider: Pt is not active but is agreeable to discuss follow up appointments.

## 2022-06-17 NOTE — GROUP NOTE
Group Therapy Note    Date: 6/17/2022    Group Start Time: 1000  Group End Time: 1100  Group Topic: Cognitive Skills    [facility]    Thankful MICAH Chiang, EMILY        Group Therapy Note    Attendees: 13       Patient's Goal:  Pt attended community support and was able to identify a daily goal as \"not let the stressors overwhelm us. \"    Notes:  Pt was an active participant in group. We discussed trauma and the effects along with therapeutic techniques to help alleviate the symptoms. Status After Intervention:  Improved    Participation Level: Active Listener and Interactive    Participation Quality: Appropriate, Attentive and Sharing      Speech:  normal      Thought Process/Content: Logical      Affective Functioning: Congruent      Mood: euthymic      Level of consciousness:  Alert, Oriented x4 and Attentive      Response to Learning: Able to verbalize current knowledge/experience, Able to verbalize/acknowledge new learning and Able to retain information      Endings: None Reported    Modes of Intervention: Education, Support, Socialization, Exploration, Clarifying and Problem-solving      Discipline Responsible: /Counselor      Signature:   MICAH Abreu, EMILY

## 2022-06-18 PROCEDURE — 6370000000 HC RX 637 (ALT 250 FOR IP): Performed by: NURSE PRACTITIONER

## 2022-06-18 PROCEDURE — 1240000000 HC EMOTIONAL WELLNESS R&B

## 2022-06-18 PROCEDURE — 6370000000 HC RX 637 (ALT 250 FOR IP): Performed by: PSYCHIATRY & NEUROLOGY

## 2022-06-18 PROCEDURE — 99232 SBSQ HOSP IP/OBS MODERATE 35: CPT | Performed by: NURSE PRACTITIONER

## 2022-06-18 RX ADMIN — OXCARBAZEPINE 150 MG: 150 TABLET, FILM COATED ORAL at 22:44

## 2022-06-18 RX ADMIN — OXCARBAZEPINE 150 MG: 150 TABLET, FILM COATED ORAL at 10:44

## 2022-06-18 RX ADMIN — CITALOPRAM HYDROBROMIDE 10 MG: 20 TABLET ORAL at 10:44

## 2022-06-18 RX ADMIN — Medication 3 MG: at 22:44

## 2022-06-18 NOTE — PROGRESS NOTES
Patient denies SI,HI, or Hallucinations at this time. Rates depression 1/10 and anxiety 3/10. He is bright on conversation. He has been cooperative. Takes his meds and attends groups. Will continue to monitor.

## 2022-06-18 NOTE — PROGRESS NOTES
BEHAVIORAL HEALTH FOLLOW-UP NOTE     6/18/2022     Patient was seen and examined in person, Chart reviewed   Patient's case discussed with staff/team    Chief Complaint: \" I am doing okay. \"    Interim History: Patient up on the unit he was seen using exercise bike this morning. He is out visible in the milieu social pleasant affect he is bright he smiles. He states the medications are \"good. \"  He states he does not feel any difference with them.   But he states he is not having any suicidal ideations intent or plan denies any homicidal ideations intent or plan he denies any auditory visualizations is no overt or covert signs psychosis he is attending groups socializing with peers states he is feeling much better than he did on admission      Appetite:   [x] Normal/Unchanged  [] Increased  [] Decreased      Sleep:       [x] Normal/Unchanged  [] Fair       [] Poor              Energy:    [x] Normal/Unchanged  [] Increased  [] Decreased        SI [] Present  [x] Absent    HI  []Present  [x] Absent     Aggression:  [] yes  [x] no    Patient is [x] able  [] unable to CONTRACT FOR SAFETY     PAST MEDICAL/PSYCHIATRIC HISTORY:   Past Medical History:   Diagnosis Date    Asthma        FAMILY/SOCIAL HISTORY:  Family History   Problem Relation Age of Onset    Cancer Mother     No Known Problems Father      Social History     Socioeconomic History    Marital status: Single     Spouse name: Not on file    Number of children: 0    Years of education: 16    Highest education level: Not on file   Occupational History    Not on file   Tobacco Use    Smoking status: Never Smoker    Smokeless tobacco: Never Used   Vaping Use    Vaping Use: Never used   Substance and Sexual Activity    Alcohol use: Not Currently     Comment: rarely    Drug use: Not Currently    Sexual activity: Not Currently   Other Topics Concern    Not on file   Social History Narrative    Not on file     Social Determinants of Health     Financial Resource Strain:     Difficulty of Paying Living Expenses: Not on file   Food Insecurity:     Worried About Running Out of Food in the Last Year: Not on file    Eli of Food in the Last Year: Not on file   Transportation Needs:     Lack of Transportation (Medical): Not on file    Lack of Transportation (Non-Medical): Not on file   Physical Activity:     Days of Exercise per Week: Not on file    Minutes of Exercise per Session: Not on file   Stress:     Feeling of Stress : Not on file   Social Connections:     Frequency of Communication with Friends and Family: Not on file    Frequency of Social Gatherings with Friends and Family: Not on file    Attends Taoist Services: Not on file    Active Member of [address] [place] [address] [email or web address] or Organizations: Not on file    Attends Club or Organization Meetings: Not on file    Marital Status: Not on file   Intimate Partner Violence:     Fear of Current or Ex-Partner: Not on file    Emotionally Abused: Not on file    Physically Abused: Not on file    Sexually Abused: Not on file   Housing Stability:     Unable to Pay for Housing in the Last Year: Not on file    Number of [place] in the Last Year: Not on file    Unstable Housing in the Last Year: Not on file           ROS:  [x] All negative/unchanged except if checked.  Explain positive(checked items) below:  [] Constitutional  [] Eyes  [] Ear/Nose/Mouth/Throat  [] Respiratory  [] CV  [] GI  []   [] Musculoskeletal  [] Skin/Breast  [] Neurological  [] Endocrine  [] Heme/Lymph  [] Allergic/Immunologic    Explanation:     MEDICATIONS:    Current Facility-Administered Medications:     citalopram (CELEXA) tablet 10 mg, 10 mg, Oral, Daily, ELISSA Rudd CNP, 10 mg at 06/17/22 1622    OXcarbazepine (TRILEPTAL) tablet 150 mg, 150 mg, Oral, BID, ELISSA Dasilva - CNP, 206 mg at 06/17/22 2039    acetaminophen (TYLENOL) tablet 650 mg, 650 mg, Oral, Q6H PRN, Amando Hyman MD    magnesium hydroxide (MILK OF MAGNESIA) 400 MG/5ML suspension 30 mL, 30 mL, Oral, Daily PRN, Flaco Caldwell MD    nicotine (NICODERM CQ) 21 MG/24HR 1 patch, 1 patch, TransDERmal, Daily, Flaco Caldwell MD    aluminum & magnesium hydroxide-simethicone (MAALOX) 200-200-20 MG/5ML suspension 30 mL, 30 mL, Oral, PRN, Flaco Caldwell MD    hydrOXYzine pamoate (VISTARIL) capsule 50 mg, 50 mg, Oral, TID PRN, Flaco Caldwell MD    haloperidol (HALDOL) tablet 5 mg, 5 mg, Oral, Q6H PRN **OR** haloperidol lactate (HALDOL) injection 5 mg, 5 mg, IntraMUSCular, Q6H PRN, Flaco Caldwell MD    melatonin tablet 3 mg, 3 mg, Oral, Nightly, Flaco Caldwell MD, 3 mg at 06/17/22 2039      Examination:  /75   Pulse 66   Temp 98.6 °F (37 °C)   Resp 14   Ht 5' 11\" (1.803 m)   Wt 150 lb (68 kg)   SpO2 98%   BMI 20.92 kg/m²   Gait - steady  Medication side effects(SE): Denies    Mental Status Examination:    Level of consciousness:  within normal limits   Appearance:  fair grooming and fair hygiene  Behavior/Motor:  no abnormalities noted  Attitude toward examiner:  cooperative  Speech:  spontaneous, normal rate and normal volume   Mood: \" My mood is good. \"  Affect: Appropriate and pleasant bright smiling  Thought processes: Linear without flights of ideas loose associations  Thought content: Devoid of any auditory visualizations delusions during the perceptual normalities. Denies SI/HI intent or plan  Cognition:  oriented to person, place, and time   Concentration intact  Insight fair   Judgement fair     ASSESSMENT:   Patient symptoms are:  [] Well controlled  [] Improving  [] Worsening  [] No change      Diagnosis:   Principal Problem:    Major depressive disorder, single episode with mixed features  Active Problems:    Cluster B personality disorder ([facility] [address]  Resolved Problems:    * No resolved hospital problems. *      LABS:    No results for input(s): WBC, HGB, PLT in the last 72 hours.   No results for input(s): NA, K, CL, CO2, BUN, CREATININE, GLUCOSE in the last 72 hours. No results for input(s): BILITOT, ALKPHOS, AST, ALT in the last 72 hours. Lab Results   Component Value Date    LABAMPH NOT DETECTED 06/14/2022    BARBSCNU NOT DETECTED 06/14/2022    LABBENZ NOT DETECTED 06/14/2022    LABMETH NOT DETECTED 06/14/2022    OPIATESCREENURINE NOT DETECTED 06/14/2022    PHENCYCLIDINESCREENURINE NOT DETECTED 06/14/2022    ETOH <10 06/14/2022     No results found for: TSH, FREET4  No results found for: LITHIUM  No results found for: VALPROATE, CBMZ        Treatment Plan:  Reviewed current Medications with the patient. Risks, benefits, side effects, drug-to-drug interactions and alternatives to treatment were discussed. Collateral information:   CD evaluation  Encourage patient to attend group and other milieu activities.   Discharge planning discussed with the patient and treatment team.    Continue Celexa 10 mg daily  Continue Trileptal 150 mg twice daily    PSYCHOTHERAPY/COUNSELING:  [x] Therapeutic interview  [x] Supportive  [] CBT  [] Ongoing  [] Other    [x] Patient continues to need, on a daily basis, active treatment furnished directly by or requiring the supervision of inpatient psychiatric personnel      Anticipated Length of stay: 3 to 7 days based on stability            Electronically signed by ELISSA Escalante CNP on 6/06/5739 at 7:29 AM

## 2022-06-18 NOTE — PROGRESS NOTES
Pt is up on the unit interacting well with other patients and staff. Pt is alert and oriented x 3, calm and cooperative. Pt denies any thoughts of suicide or homicide at this time and no dangerous behavior is noted. Pt denies any visual or auditory hallucinations and no delusional thinking is noted.

## 2022-06-18 NOTE — GROUP NOTE
Date: 6/18/2022    Group Start Time: 0945  Group End Time: 4488  Group Topic: Psychoeducation    [facility]  [id number] [place], [place]                                       Group Therapy Note    Date: 6/18/2022  Number of Participants: 16  Type of Group: Psychoeducation    Wellness Binder Information  Module Name:Nurturing  your inner child     Patient's Goal: patient will be able to id things you  struggle with and what you would tell your younger self. Notes: Pleasant and engaged in group, willing to share with others. Status After Intervention:  Improved    Participation Level:  Active Listener and Interactive    Participation Quality: Appropriate, Attentive, and Sharing      Speech:  normal       Thought Process/Content: Logical      Affective Functioning: Congruent      Mood: euthymic      Level of consciousness:  Alert, Oriented x4, and Attentive      Response to Learning: Able to verbalize/acknowledge new learning, Able to retain information, and Progressing to goal      Endings: None Reported    Modes of Intervention: Education, Support, Socialization, Exploration, and Problem-solving      Discipline Responsible: Psychoeducational Specialist      Signature:  Breanna Mccollum

## 2022-06-18 NOTE — PLAN OF CARE
Problem: Anxiety  Goal: Will report anxiety at manageable levels  Description: INTERVENTIONS:  1. Administer medication as ordered  2. Teach and rehearse alternative coping skills  3. Provide emotional support with 1:1 interaction with staff  6/17/2022 2123 by Christophe Duque RN  Outcome: Progressing     Problem: Coping  Goal: Pt/Family able to verbalize concerns and demonstrate effective coping strategies  Description: INTERVENTIONS:  1. Assist patient/family to identify coping skills, available support systems and cultural and spiritual values  2. Provide emotional support, including active listening and acknowledgement of concerns of patient and caregivers  3. Reduce environmental stimuli, as able  4. Instruct patient/family in relaxation techniques, as appropriate  5. Assess for spiritual pain/suffering and initiate Spiritual Care, Psychosocial Clinical Specialist consults as needed  6/17/2022 2123 by Christophe Duque RN  Outcome: Progressing     Problem: Behavior  Goal: Pt/Family maintain appropriate behavior and adhere to behavioral management agreement, if implemented  Description: INTERVENTIONS:  1. Assess patient/family's coping skills and  non-compliant behavior (including use of illegal substances)  2. Notify security of behavior or suspected illegal substances which indicate the need for search of the patient and/or belongings  3. Encourage verbalization of thoughts and concerns in a socially appropriate manner  4. Utilize positive, consistent limit setting strategies supporting safety of patient, staff and others  5. Encourage participation in the decision making process about the behavioral management agreement  6. Implement a Health Care Agreement if patient meets criteria  7. If a patient's behavior jeopardizes the safety of the patient, staff, or others refer to organization policy. If a visitor's behavior poses a threat to safety call refer to organization policy.   8. Initiate consult with Social Worker, Psychosocial CNS, Spiritual Care as appropriate  6/17/2022 2123 by Yvrose Arias RN  Outcome: Progressing     Problem: Depression/Self Harm  Goal: Effect of psychiatric condition will be minimized and patient will be protected from self harm  Description: INTERVENTIONS:  1. Assess impact of patient's symptoms on level of functioning, self care needs and offer support as indicated  2. Assess patient/family knowledge of depression, impact on illness and need for teaching  3. Provide emotional support, presence and reassurance  4. Assess for possible suicidal thoughts or ideation. If patient expresses suicidal thoughts or statements do not leave alone, initiate Suicide Precautions, move to a room close to the nursing station and obtain sitter  5.  Initiate consults as appropriate with Mental Health Professional, Spiritual Care, Psychosocial CNS, and consider a recommendation to the LIP for a Psychiatric Consultation  6/17/2022 2123 by Yvrose Arias RN  Outcome: Progressing

## 2022-06-19 PROCEDURE — 6370000000 HC RX 637 (ALT 250 FOR IP): Performed by: NURSE PRACTITIONER

## 2022-06-19 PROCEDURE — 99232 SBSQ HOSP IP/OBS MODERATE 35: CPT | Performed by: NURSE PRACTITIONER

## 2022-06-19 PROCEDURE — 6370000000 HC RX 637 (ALT 250 FOR IP): Performed by: PSYCHIATRY & NEUROLOGY

## 2022-06-19 PROCEDURE — 1240000000 HC EMOTIONAL WELLNESS R&B

## 2022-06-19 RX ADMIN — OXCARBAZEPINE 150 MG: 150 TABLET, FILM COATED ORAL at 22:02

## 2022-06-19 RX ADMIN — Medication 3 MG: at 22:03

## 2022-06-19 RX ADMIN — CITALOPRAM HYDROBROMIDE 10 MG: 20 TABLET ORAL at 09:22

## 2022-06-19 RX ADMIN — OXCARBAZEPINE 150 MG: 150 TABLET, FILM COATED ORAL at 09:23

## 2022-06-19 ASSESSMENT — PAIN SCALES - GENERAL: PAINLEVEL_OUTOF10: 0

## 2022-06-19 NOTE — PLAN OF CARE
Problem: Anxiety  Goal: Will report anxiety at manageable levels  Description: INTERVENTIONS:  1. Administer medication as ordered  2. Teach and rehearse alternative coping skills  3. Provide emotional support with 1:1 interaction with staff  Outcome: Progressing     Problem: Behavior  Goal: Pt/Family maintain appropriate behavior and adhere to behavioral management agreement, if implemented  Description: INTERVENTIONS:  1. Assess patient/family's coping skills and  non-compliant behavior (including use of illegal substances)  2. Notify security of behavior or suspected illegal substances which indicate the need for search of the patient and/or belongings  3. Encourage verbalization of thoughts and concerns in a socially appropriate manner  4. Utilize positive, consistent limit setting strategies supporting safety of patient, staff and others  5. Encourage participation in the decision making process about the behavioral management agreement  6. Implement a Health Care Agreement if patient meets criteria  7. If a patient's behavior jeopardizes the safety of the patient, staff, or others refer to organization policy. If a visitor's behavior poses a threat to safety call refer to organization policy. 8. Initiate consult with , Psychosocial CNS, Spiritual Care as appropriate  Outcome: Progressing     Problem: Depression/Self Harm  Goal: Effect of psychiatric condition will be minimized and patient will be protected from self harm  Description: INTERVENTIONS:  1. Assess impact of patient's symptoms on level of functioning, self care needs and offer support as indicated  2. Assess patient/family knowledge of depression, impact on illness and need for teaching  3. Provide emotional support, presence and reassurance  4. Assess for possible suicidal thoughts or ideation.  If patient expresses suicidal thoughts or statements do not leave alone, initiate Suicide Precautions, move to a room close to the nursing station and obtain sitter  5.  Initiate consults as appropriate with Mental Health Professional, Spiritual Care, Psychosocial CNS, and consider a recommendation to the LIP for a Psychiatric Consultation  Outcome: Progressing     Problem: Pain  Goal: Verbalizes/displays adequate comfort level or baseline comfort level  Outcome: Progressing

## 2022-06-19 NOTE — PLAN OF CARE
Problem: Anxiety  Goal: Will report anxiety at manageable levels  Description: INTERVENTIONS:  1. Administer medication as ordered  2. Teach and rehearse alternative coping skills  3. Provide emotional support with 1:1 interaction with staff  6/18/2022 2147 by Anthony Carty RN  Outcome: Progressing     Problem: Coping  Goal: Pt/Family able to verbalize concerns and demonstrate effective coping strategies  Description: INTERVENTIONS:  1. Assist patient/family to identify coping skills, available support systems and cultural and spiritual values  2. Provide emotional support, including active listening and acknowledgement of concerns of patient and caregivers  3. Reduce environmental stimuli, as able  4. Instruct patient/family in relaxation techniques, as appropriate  5. Assess for spiritual pain/suffering and initiate Spiritual Care, Psychosocial Clinical Specialist consults as needed  6/18/2022 2147 by Anthony Carty RN  Outcome: Progressing     Problem: Behavior  Goal: Pt/Family maintain appropriate behavior and adhere to behavioral management agreement, if implemented  Description: INTERVENTIONS:  1. Assess patient/family's coping skills and  non-compliant behavior (including use of illegal substances)  2. Notify security of behavior or suspected illegal substances which indicate the need for search of the patient and/or belongings  3. Encourage verbalization of thoughts and concerns in a socially appropriate manner  4. Utilize positive, consistent limit setting strategies supporting safety of patient, staff and others  5. Encourage participation in the decision making process about the behavioral management agreement  6. Implement a Health Care Agreement if patient meets criteria  7. If a patient's behavior jeopardizes the safety of the patient, staff, or others refer to organization policy. If a visitor's behavior poses a threat to safety call refer to organization policy.   8. Initiate consult with Social Worker, Psychosocial CNS, Spiritual Care as appropriate  6/18/2022 2147 by Rufina Mcgovern RN  Outcome: Progressing     Problem: Depression/Self Harm  Goal: Effect of psychiatric condition will be minimized and patient will be protected from self harm  Description: INTERVENTIONS:  1. Assess impact of patient's symptoms on level of functioning, self care needs and offer support as indicated  2. Assess patient/family knowledge of depression, impact on illness and need for teaching  3. Provide emotional support, presence and reassurance  4. Assess for possible suicidal thoughts or ideation. If patient expresses suicidal thoughts or statements do not leave alone, initiate Suicide Precautions, move to a room close to the nursing station and obtain sitter  5. Initiate consults as appropriate with Mental Health Professional, Spiritual Care, Psychosocial CNS, and consider a recommendation to the LIP for a Psychiatric Consultation  6/18/2022 2147 by Rufina Mcgovern RN  Outcome: Progressing       Pt is alert and oriented x 3, calm and cooperative with good eye contact. Pt denies any thoughts of suicide or homicide and no dangerous behavior is noted. Pt denies any visual or auditory hallucinations and no delusional thinking is noted.

## 2022-06-19 NOTE — PROGRESS NOTES
Patient denies SI,HI, or any Hallucinations at this time. He is brightened on conversation. Very cooperative on assessment. He states he has low self esteem and it prevents him from succeeding at things in life he would like to try, example acting. He is out on the unit, social, takes his meds and attends groups. Will contnuet to monitor.

## 2022-06-19 NOTE — PLAN OF CARE
Problem: Anxiety  Goal: Will report anxiety at manageable levels  Description: INTERVENTIONS:  1. Administer medication as ordered  2. Teach and rehearse alternative coping skills  3. Provide emotional support with 1:1 interaction with staff  6/19/2022 1940 by Annabel Abreu RN  Outcome: Progressing  6/19/2022 1339 by Neda Workman RN  Outcome: Progressing           Out and social on the unit. Denies suicidal thoughts or intent to harm himself or others. No voiced delusions. Denies hallucinations. Reports anxiety is a 2 out of 10. Taking po foods and fluids well. Sleeping well at night.

## 2022-06-19 NOTE — PROGRESS NOTES
BEHAVIORAL HEALTH FOLLOW-UP NOTE     6/19/2022     Patient was seen and examined in person, Chart reviewed   Patient's case discussed with staff/team    Chief Complaint: \" I am doing really good. \"    Interim History: Patient up on the milieu bright pleasant affect vehemently denies SI/HI intent or plan denies any auditory visualizations states the medications are working "[employer] well surprisingly. \"  He states he plans to live with his godfather on discharge.   He is eating well sleeping well no neurovegetative signs of depression no overt or covert signs psychosis      Appetite:   [x] Normal/Unchanged  [] Increased  [] Decreased      Sleep:       [x] Normal/Unchanged  [] Fair       [] Poor              Energy:    [x] Normal/Unchanged  [] Increased  [] Decreased        SI [] Present  [x] Absent    HI  []Present  [x] Absent     Aggression:  [] yes  [x] no    Patient is [x] able  [] unable to CONTRACT FOR SAFETY     PAST MEDICAL/PSYCHIATRIC HISTORY:   Past Medical History:   Diagnosis Date    Asthma        FAMILY/SOCIAL HISTORY:  Family History   Problem Relation Age of Onset    Cancer Mother     No Known Problems Father      Social History     Socioeconomic History    Marital status: Single     Spouse name: Not on file    Number of children: 0    Years of education: 16    Highest education level: Not on file   Occupational History    Not on file   Tobacco Use    Smoking status: Never Smoker    Smokeless tobacco: Never Used   Vaping Use    Vaping Use: Never used   Substance and Sexual Activity    Alcohol use: Not Currently     Comment: rarely    Drug use: Not Currently    Sexual activity: Not Currently   Other Topics Concern    Not on file   Social History Narrative    Not on file     Social Determinants of Health     Financial Resource Strain:     Difficulty of Paying Living Expenses: Not on file   Food Insecurity:     Worried About Running Out of Food in the Last Year: Not on file    Eli of Food in the Last Year: Not on file   Transportation Needs:     Lack of Transportation (Medical): Not on file    Lack of Transportation (Non-Medical): Not on file   Physical Activity:     Days of Exercise per Week: Not on file    Minutes of Exercise per Session: Not on file   Stress:     Feeling of Stress : Not on file   Social Connections:     Frequency of Communication with Friends and Family: Not on file    Frequency of Social Gatherings with Friends and Family: Not on file    Attends Spiritism Services: Not on file    Active Member of [address] [place] [address] [employer] or Organizations: Not on file    Attends Club or Organization Meetings: Not on file    Marital Status: Not on file   Intimate Partner Violence:     Fear of Current or Ex-Partner: Not on file    Emotionally Abused: Not on file    Physically Abused: Not on file    Sexually Abused: Not on file   Housing Stability:     Unable to Pay for Housing in the Last Year: Not on file    Number of [place] in the Last Year: Not on file    Unstable Housing in the Last Year: Not on file           ROS:  [x] All negative/unchanged except if checked.  Explain positive(checked items) below:  [] Constitutional  [] Eyes  [] Ear/Nose/Mouth/Throat  [] Respiratory  [] CV  [] GI  []   [] Musculoskeletal  [] Skin/Breast  [] Neurological  [] Endocrine  [] Heme/Lymph  [] Allergic/Immunologic    Explanation:     MEDICATIONS:    Current Facility-Administered Medications:     citalopram (CELEXA) tablet 10 mg, 10 mg, Oral, Daily, Charyl Bamberger Dellick, APRN - CNP, 10 mg at 06/19/22 3238    OXcarbazepine (TRILEPTAL) tablet 150 mg, 150 mg, Oral, BID, Charyl Bamberger Dellick, APRN - CNP, 455 mg at 06/19/22 9553    acetaminophen (TYLENOL) tablet 650 mg, 650 mg, Oral, Q6H PRN, Denise Menendez MD    magnesium hydroxide (MILK OF MAGNESIA) 400 MG/5ML suspension 30 mL, 30 mL, Oral, Daily PRN, Denise Menendez MD    aluminum & magnesium hydroxide-simethicone (MAALOX) 900-220-01 MG/5ML suspension 30 mL, 30 mL, 06/14/2022    OPIATESCREENURINE NOT DETECTED 06/14/2022    PHENCYCLIDINESCREENURINE NOT DETECTED 06/14/2022    ETOH <10 06/14/2022     No results found for: TSH, FREET4  No results found for: LITHIUM  No results found for: VALPROATE, CBMZ        Treatment Plan:  Reviewed current Medications with the patient. Risks, benefits, side effects, drug-to-drug interactions and alternatives to treatment were discussed. Collateral information:   CD evaluation  Encourage patient to attend group and other milieu activities.   Discharge planning discussed with the patient and treatment team.    Continue Celexa 10 mg daily  Continue Trileptal 150 mg twice daily    PSYCHOTHERAPY/COUNSELING:  [x] Therapeutic interview  [x] Supportive  [] CBT  [] Ongoing  [] Other    [x] Patient continues to need, on a daily basis, active treatment furnished directly by or requiring the supervision of inpatient psychiatric personnel      Anticipated Length of stay: 3 to 7 days based on stability            Electronically signed by ELISSA Haro CNP on 2/47/8399 at 10:48 AM

## 2022-06-19 NOTE — GROUP NOTE
Date: 6/19/2022    Group Start Time: 0930  Group End Time: 8085  Group Topic: Psychoeducation    [facility]  [id number] [place], [place]        Group Therapy Note                                                                       Date: 6/19/2022  Number of Participants:13  Subject: patient safety plan   Type of Group: Psychoeducation      Patient's Goal: patient will be able to id triggers, coping skills, and support that can contribute to his and her own wellness once they are d/[place]. Notes:  Pleasant and sharing in group able to complete handout. Status After Intervention:  Improved    Participation Level:  Active Listener and Interactive    Participation Quality: Appropriate, Attentive, Sharing, and Supportive      Speech: normal     Thought Process/Content: Logical      Affective Functioning: Congruent      Mood: euthymic      Level of consciousness:  Alert, Oriented x4, and Attentive      Response to Learning: Able to verbalize/acknowledge new learning, Able to retain information, and Progressing to goal      Endings: None Reported    Modes of Intervention: Education, Support, Socialization, Exploration, and Problem-solving      Discipline Responsible: Psychoeducational Specialist      Signature:  Litzy Bhatia

## 2022-06-20 VITALS
WEIGHT: 150 LBS | HEIGHT: 71 IN | HEART RATE: 64 BPM | BODY MASS INDEX: 21 KG/M2 | TEMPERATURE: 97.9 F | DIASTOLIC BLOOD PRESSURE: 72 MMHG | RESPIRATION RATE: 14 BRPM | OXYGEN SATURATION: 97 % | SYSTOLIC BLOOD PRESSURE: 109 MMHG

## 2022-06-20 PROCEDURE — 6370000000 HC RX 637 (ALT 250 FOR IP): Performed by: NURSE PRACTITIONER

## 2022-06-20 PROCEDURE — 99239 HOSP IP/OBS DSCHRG MGMT >30: CPT | Performed by: NURSE PRACTITIONER

## 2022-06-20 RX ORDER — LANOLIN ALCOHOL/MO/W.PET/CERES
3 CREAM (GRAM) TOPICAL NIGHTLY
Qty: 30 TABLET | Refills: 0 | Status: SHIPPED | OUTPATIENT
Start: 2022-06-20 | End: 2022-07-20

## 2022-06-20 RX ORDER — CITALOPRAM 10 MG/1
10 TABLET ORAL DAILY
Qty: 30 TABLET | Refills: 0 | Status: SHIPPED | OUTPATIENT
Start: 2022-06-21 | End: 2022-07-21

## 2022-06-20 RX ORDER — OXCARBAZEPINE 150 MG/1
150 TABLET, FILM COATED ORAL 2 TIMES DAILY
Qty: 60 TABLET | Refills: 0 | Status: SHIPPED | OUTPATIENT
Start: 2022-06-20 | End: 2022-07-20

## 2022-06-20 RX ADMIN — CITALOPRAM HYDROBROMIDE 10 MG: 20 TABLET ORAL at 09:16

## 2022-06-20 RX ADMIN — OXCARBAZEPINE 150 MG: 150 TABLET, FILM COATED ORAL at 09:16

## 2022-06-20 ASSESSMENT — PAIN SCALES - GENERAL: PAINLEVEL_OUTOF10: 0

## 2022-06-20 NOTE — CARE COORDINATION
Sw met with pt to discuss discharge. Pt plans to return home with Isaura Shukla and be transported by Isaura Shukla. Pt appears bright, was found in common area doing a puzzle. Pt denies SI/HI/AVH, denies having any concerns for his discharge. Pt was informed of his [facility] appointment for tomorrow morning.      In order to ensure appropriate transition and discharge planning is in place, the following documents have been transmitted to [facility], as the new outpatient provider:     The d/c diagnosis was transmitted to the next care provider   The reason for hospitalization was transmitted to the next care provider   The d/c medications (dosage and indication) were transmitted to the next care provider    The continuing care plan was transmitted to the next care provider

## 2022-06-20 NOTE — BH NOTE
Pt discharged with followings belongings:   Dental Appliances: None  Vision - Corrective Lenses: None  Hearing Aid: None  Jewelry: None  Body Piercings Removed: N/A  Clothing: Belt,Footwear,Jacket/Coat,Pants,Shirt,Socks  Other Valuables: Tri Page (Comment) ([place] ID Sarika Jaeger)   Valuables sent home with patient. Valuables retrieved from safe, Security envelope number:   none and returned to patient. Patient left department with Departure Mode: Family member via Mobility at Departure: Ambulatory, discharged to Discharged to: Private Residence. Patient education on aftercare instructions:  yes  Patient verbalize understanding of AVS:   yes. Given suicide prevention handout . Discharged in stable condition. Status EXAM upon discharge:  Mental Status and Behavioral Exam  Normal: No  Level of Assistance: Independent/Self  Facial Expression: Brightened  Affect: Appropriate,Congruent  Level of Consciousness: Alert  Frequency of Checks: 4 times per hour, close  Mood:Normal: No  Mood: Anxious  Motor Activity:Normal: Yes  Eye Contact: Good  Observed Behavior: Cooperative  Sexual Misconduct History: Current - no  Preception: [place] to person,[place] to time,[place] to place,[place] to situation  Attention:Normal: No  Attention: Distractible  Thought Processes: Tangential  Thought Content:Normal: Yes  Thought Content: Preoccupations  Depression Symptoms: No problems reported or observed. Anxiety Symptoms: Generalized  Carolyne Symptoms: No problems reported or observed.   Hallucinations: None  Delusions: No  Memory:Normal: Yes  Insight and Judgment: No  Insight and Judgment: Poor judgment,Poor insight    Marlene Hodges RN

## 2022-06-20 NOTE — DISCHARGE SUMMARY
Transportation Needs:     Lack of Transportation (Medical): Not on file    Lack of Transportation (Non-Medical):  Not on file   Physical Activity:     Days of Exercise per Week: Not on file    Minutes of Exercise per Session: Not on file   Stress:     Feeling of Stress : Not on file   Social Connections:     Frequency of Communication with Friends and Family: Not on file    Frequency of Social Gatherings with Friends and Family: Not on file    Attends Zoroastrianism Services: Not on file    Active Member of [address] [place] [address] or Organizations: Not on file    Attends Club or Organization Meetings: Not on file    Marital Status: Not on file   Intimate Partner Violence:     Fear of Current or Ex-Partner: Not on file    Emotionally Abused: Not on file    Physically Abused: Not on file    Sexually Abused: Not on file   Housing Stability:     Unable to Pay for Housing in the Last Year: Not on file    Number of [place] in the Last Year: Not on file    Unstable Housing in the Last Year: Not on file       MEDICATIONS:    Current Facility-Administered Medications:     citalopram (CELEXA) tablet 10 mg, 10 mg, Oral, Daily, ELISSA Marvin - CNP, 10 mg at 06/20/22 0916    OXcarbazepine (TRILEPTAL) tablet 150 mg, 150 mg, Oral, BID, ELISSA Ochoa - CNP, 556 mg at 06/20/22 0916    acetaminophen (TYLENOL) tablet 650 mg, 650 mg, Oral, Q6H PRN, Melonie Shah MD    magnesium hydroxide (MILK OF MAGNESIA) 400 MG/5ML suspension 30 mL, 30 mL, Oral, Daily PRN, Melonie Shah MD    aluminum & magnesium hydroxide-simethicone (MAALOX) 200-200-20 MG/5ML suspension 30 mL, 30 mL, Oral, PRN, Melonie Shah MD    hydrOXYzine pamoate (VISTARIL) capsule 50 mg, 50 mg, Oral, TID PRN, Melonie Shah MD    haloperidol (HALDOL) tablet 5 mg, 5 mg, Oral, Q6H PRN **OR** haloperidol lactate (HALDOL) injection 5 mg, 5 mg, IntraMUSCular, Q6H PRN, Melonie Shah MD    melatonin tablet 3 mg, 3 mg, Oral, Nightly, Veronica Noriega Layne Owen MD, 3 mg at 06/19/22 2203    Examination:  /72   Pulse 64   Temp 97.9 °F (36.6 °C)   Resp 14   Ht 5' 11\" (1.803 m)   Wt 150 lb (68 kg)   SpO2 97%   BMI 20.92 kg/m²   Gait - steady    HOSPITAL COURSE[de-identified]   Patient was admitted to the unit on 6/14/2022 was closely monitored for suicidal ideations. He was evaluated was treated with Trileptal 150 mg twice daily and Celexa 10 mg daily medical events were insignificant and patient continued to improve on the floor. He start coming out of his room he is attending groups to socializing with peers. He never made any suicidal statements or any suicidal gestures while in the unit. Social workers obtain collateral information from patient's father who was able to voicing concerns that he had. He reported no safety concerns no access to any guns. Patient was able to voice his future plan spontaneously with regions of detail which include getting back to work and working on his writing and his poetry. Treatment team felt the patient obtain the maximum benefit from his hospitalization he was set up with an outpatient mental health agency for outpatient follow-up services. At the time of discharge patient did not show any impulsive behavior. He was up on the unit he was attending groups and socializing with peers. He vehemently denied any suicidal homicidal ideations intent or plan. He was eating well and sleeping well there are no neurovegetative signs or symptoms of depression he denied any auditory or visual hallucinations. There are no overt or covert signs of psychosis. He was appreciative of the help that he received here. This patient no longer meets criteria for inpatient hospitalization.             No AVH or paranoid thoughts  No hopeless or worthless feeling  No active SI/HI  Appetite:  [x] Normal  [] Increased  [] Decreased    Sleep:       [x] Normal  [] Fair       [] Poor            Energy:    [x] Normal  [] Increased  [] Decreased SI [] Present  [x] Absent  HI  []Present  [x] Absent   Aggression:  [] yes  [] no  Patient is [x] able  [] unable to CONTRACT FOR SAFETY   Medication side effects(SE):  [x] None(Psych. Meds.) [] Other      Mental Status Examination on discharge:    Level of consciousness:  within normal limits   Appearance:  well-appearing  Behavior/Motor:  no abnormalities noted  Attitude toward examiner:  attentive and good eye contact  Speech:  spontaneous, normal rate and normal volume   Mood: \" My mood is good. \"  Affect: Appropriate and pleasant  Thought processes: Linear without flight of ideas or loose associations  Thought content: Devoid of any auditory visual loose [place] delusions or other perceptual normalities. Denies SI/HI intent or plan  Cognition:  oriented to person, place, and time   Concentration intact  Memory intact  Insight good   Judgement fair   Fund of Knowledge adequate      ASSESSMENT:  Patient symptoms are:  [x] Well controlled  [x] Improving  [] Worsening  [] No change    Reason for more than one antipsychotic:  [x] N/A  [] 3 Failed Monotherapy attempts (Drugs tried:)  [] Crossover to a new antipsychotic  [] Taper to Monotherapy from Polypharmacy  [] Augmentation of clozapine therapy due to treatment resistance to single therapy    Diagnosis:  Principal Problem:    Major depressive disorder, single episode with mixed features  Active Problems:    Cluster B personality disorder ([facility] [address]  Resolved Problems:    * No resolved hospital problems. *      LABS:    No results for input(s): WBC, HGB, PLT in the last 72 hours. No results for input(s): NA, K, CL, CO2, BUN, CREATININE, GLUCOSE in the last 72 hours. No results for input(s): BILITOT, ALKPHOS, AST, ALT in the last 72 hours.   Lab Results   Component Value Date    LABAMPH NOT DETECTED 06/14/2022    BARBSCNU NOT DETECTED 06/14/2022    LABBENZ NOT DETECTED 06/14/2022    LABMETH NOT DETECTED 06/14/2022    OPIATESCREENURINE NOT DETECTED 06/14/2022 PHENCYCLIDINESCREENURINE NOT DETECTED 06/14/2022    ETOH <10 06/14/2022     No results found for: TSH, FREET4  No results found for: LITHIUM  No results found for: VALPROATE, CBMZ    RISK ASSESSMENT AT DISCHARGE: Low risk for suicide and homicide. Treatment Plan:  Reviewed current Medications with the patient. Education provided on the complaince with treatment. Risks, benefits, side effects, drug-to-drug interactions and alternatives to treatment were discussed. Encourage patient to attend outpatient follow up appointment and therapy. Patient was advised to call the outpatient provider, visit the nearest ED or call 911 if symptoms are not manageable. Patient's family member was contacted prior to the discharge.          Medication List      START taking these medications    citalopram 10 MG tablet  Commonly known as: CELEXA  Take 1 tablet by mouth daily  Start taking on: June 21, 2022     melatonin 3 MG Tabs tablet  Take 1 tablet by mouth nightly     OXcarbazepine 150 MG tablet  Commonly known as: TRILEPTAL  Take 1 tablet by mouth 2 times daily           Where to Get Your Medications      These medications were sent to [facility] [address] [address]    Phone: [phone]   · citalopram 10 MG tablet  · melatonin 3 MG Tabs tablet  · OXcarbazepine 150 MG tablet       Patient is counseled he must been compliant with all medications all outpatient follow-up appointments    Patient is discharged home in stable condition    TIME SPEND - 35 MINUTES TO COMPLETE THE EVALUATION, DISCHARGE SUMMARY, MEDICATION RECONCILIATION AND FOLLOW UP CARE     Signed:  ELISSA Benavides CNP  9/29/9237  9:32 AM

## 2022-06-20 NOTE — GROUP NOTE
Group Therapy Note    Date: 6/20/2022    Group Start Time: 1100  Group End Time: 1140  Group Topic: Psychotherapy    [facility]    Cristela Fothergill, MSW, LSW        Group Therapy Note    Attendees: 4         Patient's Goal:  Pt's agenda is to learn new coping skills during group. Notes:  Pt made connections and participated in group. Status After Intervention:  Improved    Participation Level:  Active Listener and Interactive    Participation Quality: Appropriate, Attentive, Sharing and Supportive      Speech:  normal      Thought Process/Content: Logical  Linear      Affective Functioning: Congruent      Mood: depressed      Level of consciousness:  Alert, Oriented x4 and Attentive      Response to Learning: Able to verbalize current knowledge/experience      Endings: None Reported    Modes of Intervention: Education, Support, Socialization, Exploration and Clarifying      Discipline Responsible: /Counselor      Signature:  Cristela Fothergill, MSW, LSW

## 2022-06-20 NOTE — CARE COORDINATION
Sai met with pt to discuss follow up provider. Pt is agreeable to follow up with [facility] since he is a [facility] resident with no insurance. Sai contacted pt [employer] [phone] and gained collateral. Maria Esther Purcell reports that pt went into a depression after pt's mom passed a couple months ago, and that he didn't realize how bad pt's depression was. He reports that he spoke with pt yesterday and that pt \"sounded great\", also reports that pt has been talking with pt's brother and that his brother thinks pt sounds much better as well. Maria Esther Purcell reports that he has no concerns if pt were to be discharged, reports that he can pick pt up upon discharge. Maria Esther Purcell denies pt access to weapons.

## 2022-06-20 NOTE — PROGRESS NOTES
CLINICAL PHARMACY NOTE: MEDS TO BEDS    Total # of Prescriptions Filled: 3   The following medications were delivered to the patient:  · Citalopram 10mg  · Melatonin 3m  · Oxcarbazepine 150mg    Additional Documentation:    Picked up

## 2024-09-06 ENCOUNTER — HOSPITAL ENCOUNTER (EMERGENCY)
Age: 34
Discharge: HOME | End: 2024-09-06
Attending: EMERGENCY MEDICINE

## 2024-09-06 VITALS
OXYGEN SATURATION: 98 % | RESPIRATION RATE: 16 BRPM | HEIGHT: 71 IN | BODY MASS INDEX: 30.8 KG/M2 | TEMPERATURE: 98.6 F | WEIGHT: 220 LBS | DIASTOLIC BLOOD PRESSURE: 74 MMHG | HEART RATE: 84 BPM | SYSTOLIC BLOOD PRESSURE: 116 MMHG

## 2024-09-06 DIAGNOSIS — E86.0 DEHYDRATION: ICD-10-CM

## 2024-09-06 DIAGNOSIS — N17.9 AKI (ACUTE KIDNEY INJURY) (HCC): Primary | ICD-10-CM

## 2024-09-06 DIAGNOSIS — R19.7 DIARRHEA, UNSPECIFIED TYPE: ICD-10-CM

## 2024-09-06 LAB
ALBUMIN SERPL-MCNC: 4.7 G/DL (ref 3.5–5.2)
ALP SERPL-CCNC: 90 U/L (ref 40–129)
ALT SERPL-CCNC: 18 U/L (ref 0–40)
ANION GAP SERPL CALCULATED.3IONS-SCNC: 14 MMOL/L (ref 7–16)
ANION GAP SERPL CALCULATED.3IONS-SCNC: 17 MMOL/L (ref 7–16)
AST SERPL-CCNC: 22 U/L (ref 0–39)
BASOPHILS # BLD: 0 K/UL (ref 0–0.2)
BASOPHILS NFR BLD: 0 % (ref 0–2)
BILIRUB SERPL-MCNC: 0.9 MG/DL (ref 0–1.2)
BUN SERPL-MCNC: 20 MG/DL (ref 6–20)
BUN SERPL-MCNC: 21 MG/DL (ref 6–20)
CALCIUM SERPL-MCNC: 8.2 MG/DL (ref 8.6–10.2)
CALCIUM SERPL-MCNC: 9.5 MG/DL (ref 8.6–10.2)
CHLORIDE SERPL-SCNC: 103 MMOL/L (ref 98–107)
CHLORIDE SERPL-SCNC: 99 MMOL/L (ref 98–107)
CO2 SERPL-SCNC: 20 MMOL/L (ref 22–29)
CO2 SERPL-SCNC: 20 MMOL/L (ref 22–29)
CREAT SERPL-MCNC: 1.4 MG/DL (ref 0.7–1.2)
CREAT SERPL-MCNC: 1.6 MG/DL (ref 0.7–1.2)
DATE, STOOL #1: ABNORMAL
EOSINOPHIL # BLD: 0 K/UL (ref 0.05–0.5)
EOSINOPHILS RELATIVE PERCENT: 0 % (ref 0–6)
ERYTHROCYTE [DISTWIDTH] IN BLOOD BY AUTOMATED COUNT: 13.3 % (ref 11.5–15)
GFR, ESTIMATED: 58 ML/MIN/1.73M2
GFR, ESTIMATED: 65 ML/MIN/1.73M2
GLUCOSE SERPL-MCNC: 107 MG/DL (ref 74–99)
GLUCOSE SERPL-MCNC: 98 MG/DL (ref 74–99)
HCT VFR BLD AUTO: 44.9 % (ref 37–54)
HEMOCCULT SP1 STL QL: POSITIVE
HEMOCCULT SP2 STL QL: NEGATIVE
HEMOCCULT SP3 STL QL: NEGATIVE
HGB BLD-MCNC: 15.1 G/DL (ref 12.5–16.5)
LYMPHOCYTES NFR BLD: 0.75 K/UL (ref 1.5–4)
LYMPHOCYTES RELATIVE PERCENT: 12 % (ref 20–42)
MCH RBC QN AUTO: 29.9 PG (ref 26–35)
MCHC RBC AUTO-ENTMCNC: 33.6 G/DL (ref 32–34.5)
MCV RBC AUTO: 88.9 FL (ref 80–99.9)
MONOCYTES NFR BLD: 0.21 K/UL (ref 0.1–0.95)
MONOCYTES NFR BLD: 4 % (ref 2–12)
NEUTROPHILS NFR BLD: 84 % (ref 43–80)
NEUTS SEG NFR BLD: 5.14 K/UL (ref 1.8–7.3)
PLATELET # BLD AUTO: 209 K/UL (ref 130–450)
PMV BLD AUTO: 10.2 FL (ref 7–12)
POTASSIUM SERPL-SCNC: 3.8 MMOL/L (ref 3.5–5)
POTASSIUM SERPL-SCNC: 3.9 MMOL/L (ref 3.5–5)
PROT SERPL-MCNC: 8.7 G/DL (ref 6.4–8.3)
RBC # BLD AUTO: 5.05 M/UL (ref 3.8–5.8)
RBC # BLD: NORMAL 10*6/UL
SODIUM SERPL-SCNC: 136 MMOL/L (ref 132–146)
SODIUM SERPL-SCNC: 137 MMOL/L (ref 132–146)
TIME, STOOL #1: ABNORMAL
WBC OTHER # BLD: 6.1 K/UL (ref 4.5–11.5)

## 2024-09-06 PROCEDURE — 96360 HYDRATION IV INFUSION INIT: CPT

## 2024-09-06 PROCEDURE — 99285 EMERGENCY DEPT VISIT HI MDM: CPT

## 2024-09-06 PROCEDURE — 80048 BASIC METABOLIC PNL TOTAL CA: CPT

## 2024-09-06 PROCEDURE — 87425 ROTAVIRUS AG IA: CPT

## 2024-09-06 PROCEDURE — 96361 HYDRATE IV INFUSION ADD-ON: CPT

## 2024-09-06 PROCEDURE — 87045 FECES CULTURE AEROBIC BACT: CPT

## 2024-09-06 PROCEDURE — 87427 SHIGA-LIKE TOXIN AG IA: CPT

## 2024-09-06 PROCEDURE — 82270 OCCULT BLOOD FECES: CPT

## 2024-09-06 PROCEDURE — 87329 GIARDIA AG IA: CPT

## 2024-09-06 PROCEDURE — 87449 NOS EACH ORGANISM AG IA: CPT

## 2024-09-06 PROCEDURE — 85025 COMPLETE CBC W/AUTO DIFF WBC: CPT

## 2024-09-06 PROCEDURE — 82705 FATS/LIPIDS FECES QUAL: CPT

## 2024-09-06 PROCEDURE — 80053 COMPREHEN METABOLIC PANEL: CPT

## 2024-09-06 PROCEDURE — 87324 CLOSTRIDIUM AG IA: CPT

## 2024-09-06 PROCEDURE — 2580000003 HC RX 258

## 2024-09-06 PROCEDURE — 87046 STOOL CULTR AEROBIC BACT EA: CPT

## 2024-09-06 RX ORDER — 0.9 % SODIUM CHLORIDE 0.9 %
1000 INTRAVENOUS SOLUTION INTRAVENOUS ONCE
Status: COMPLETED | OUTPATIENT
Start: 2024-09-06 | End: 2024-09-06

## 2024-09-06 RX ADMIN — SODIUM CHLORIDE 1000 ML: 9 INJECTION, SOLUTION INTRAVENOUS at 12:58

## 2024-09-06 RX ADMIN — SODIUM CHLORIDE 1000 ML: 9 INJECTION, SOLUTION INTRAVENOUS at 11:55

## 2024-09-06 ASSESSMENT — LIFESTYLE VARIABLES: HOW OFTEN DO YOU HAVE A DRINK CONTAINING ALCOHOL: MONTHLY OR LESS

## 2024-09-06 NOTE — ED PROVIDER NOTES
[facility] EMERGENCY DEPARTMENT  EMERGENCY DEPARTMENT ENCOUNTER        Pt Name: Jovan Garner  MRN: [id number]  Birthdate 1990  Date of evaluation: 9/6/2024  Provider: Dulce Tong MD  PCP: No primary care provider on file.  Note Started: 11:07 AM EDT 9/6/24    CHIEF COMPLAINT       Chief Complaint   Patient presents with    Abdominal Pain    Headache     Abdominal pain, diarrhea, headache since yesterday.        HISTORY OF PRESENT ILLNESS: 1 or more Elements   History From: Patient    Limitations to history : None    Jovan Garner is a 34 y.o. male who presents for watery diarrhea starting yesterday.  Patient also endorsed some generalized abdominal comfort as well as headache that has improved today.  Denies recent antibiotic use in the last month or so.  Denies recent travels.  He denies any black or bloody stools.  Denies nausea, vomiting, dysuria, chest pain, shortness of breath, fevers, cough, runny nose, sore throat.  He did present to urgent care prior to coming to the ED who tested for COVID and flu that were negative.  He was found to be tachycardic to 110 so he was recommended to come to the ED for evaluation.  He does endorse decreased appetite and decreased oral intake.    Nursing Notes were all reviewed and agreed with or any disagreements were addressed in the HPI.        REVIEW OF EXTERNAL NOTE :       Patient was last seen 2 years ago on 6/14/2022 for suicidal ideation    REVIEW OF SYSTEMS :           Positives and Pertinent negatives as per HPI.     SURGICAL HISTORY   No past surgical history on file.    CURRENTMEDICATIONS       Discharge Medication List as of 9/6/2024  3:02 PM        CONTINUE these medications which have NOT CHANGED    Details   citalopram (CELEXA) 10 MG tablet Take 1 tablet by mouth daily, Disp-30 tablet, R-0Normal      melatonin 3 MG TABS tablet Take 1 tablet by mouth nightly, Disp-30 tablet, R-0Normal      OXcarbazepine  of 9/6/2024  3:02 PM                 (Please note that portions of this note were completed with a voice recognition program.  Efforts were made to edit the dictations but occasionally words are mis-transcribed.)    Dulce Tong MD (electronically signed)

## 2024-09-06 NOTE — DISCHARGE INSTRUCTIONS
Please return to the emergency department if diarrhea is persistent and you feel dehydrated despite oral intake of fluids.  Please also return if you have difficulties with urination that are new.  Otherwise please follow-up with your primary care physician for further evaluation

## 2024-09-07 LAB
C DIFF GDH + TOXINS A+B STL QL IA.RAPID: NEGATIVE
SPECIMEN DESCRIPTION: NORMAL

## 2024-09-08 LAB
MICROORGANISM SPEC CULT: NORMAL
ROTAVIRUS ANTIGEN: NEGATIVE
SOURCE, 60200063: NORMAL
SPECIMEN DESCRIPTION: NORMAL

## 2024-09-09 LAB
G LAMBLIA AG STL QL IA: NEGATIVE
MICROORGANISM SPEC CULT: ABNORMAL
SOURCE: NORMAL
SPECIMEN DESCRIPTION: ABNORMAL
SPECIMEN DESCRIPTION: NORMAL

## 2024-09-10 LAB
FAT QUALITATIVE SPLIT STOOL: NORMAL
FECAL NEUTRAL FAT: NORMAL